# Patient Record
Sex: FEMALE | Race: WHITE | NOT HISPANIC OR LATINO
[De-identification: names, ages, dates, MRNs, and addresses within clinical notes are randomized per-mention and may not be internally consistent; named-entity substitution may affect disease eponyms.]

---

## 2017-09-07 PROBLEM — Z00.00 ENCOUNTER FOR PREVENTIVE HEALTH EXAMINATION: Status: ACTIVE | Noted: 2017-09-07

## 2017-09-14 ENCOUNTER — LABORATORY RESULT (OUTPATIENT)
Age: 82
End: 2017-09-14

## 2017-09-14 ENCOUNTER — APPOINTMENT (OUTPATIENT)
Dept: VASCULAR SURGERY | Facility: CLINIC | Age: 82
End: 2017-09-14
Payer: MEDICARE

## 2017-09-14 VITALS
TEMPERATURE: 97.7 F | WEIGHT: 150 LBS | HEIGHT: 61 IN | HEART RATE: 79 BPM | BODY MASS INDEX: 28.32 KG/M2 | SYSTOLIC BLOOD PRESSURE: 124 MMHG | DIASTOLIC BLOOD PRESSURE: 69 MMHG

## 2017-09-14 VITALS — DIASTOLIC BLOOD PRESSURE: 66 MMHG | HEART RATE: 78 BPM | SYSTOLIC BLOOD PRESSURE: 124 MMHG

## 2017-09-14 PROCEDURE — 99203 OFFICE O/P NEW LOW 30 MIN: CPT | Mod: 25

## 2017-09-14 PROCEDURE — 93880 EXTRACRANIAL BILAT STUDY: CPT

## 2017-09-25 ENCOUNTER — APPOINTMENT (OUTPATIENT)
Dept: VASCULAR SURGERY | Facility: CLINIC | Age: 82
End: 2017-09-25
Payer: MEDICARE

## 2017-09-25 PROCEDURE — 93882 EXTRACRANIAL UNI/LTD STUDY: CPT | Mod: LT

## 2017-10-05 ENCOUNTER — OUTPATIENT (OUTPATIENT)
Dept: OUTPATIENT SERVICES | Facility: HOSPITAL | Age: 82
LOS: 1 days | End: 2017-10-05
Payer: MEDICARE

## 2017-10-05 VITALS
DIASTOLIC BLOOD PRESSURE: 60 MMHG | HEART RATE: 66 BPM | TEMPERATURE: 97 F | WEIGHT: 153 LBS | SYSTOLIC BLOOD PRESSURE: 130 MMHG | RESPIRATION RATE: 16 BRPM | HEIGHT: 60 IN

## 2017-10-05 DIAGNOSIS — Z90.710 ACQUIRED ABSENCE OF BOTH CERVIX AND UTERUS: Chronic | ICD-10-CM

## 2017-10-05 DIAGNOSIS — Z98.62 PERIPHERAL VASCULAR ANGIOPLASTY STATUS: Chronic | ICD-10-CM

## 2017-10-05 DIAGNOSIS — Z90.89 ACQUIRED ABSENCE OF OTHER ORGANS: Chronic | ICD-10-CM

## 2017-10-05 DIAGNOSIS — I65.29 OCCLUSION AND STENOSIS OF UNSPECIFIED CAROTID ARTERY: ICD-10-CM

## 2017-10-05 DIAGNOSIS — E03.9 HYPOTHYROIDISM, UNSPECIFIED: ICD-10-CM

## 2017-10-05 DIAGNOSIS — I10 ESSENTIAL (PRIMARY) HYPERTENSION: ICD-10-CM

## 2017-10-05 DIAGNOSIS — Z95.0 PRESENCE OF CARDIAC PACEMAKER: Chronic | ICD-10-CM

## 2017-10-05 DIAGNOSIS — G47.33 OBSTRUCTIVE SLEEP APNEA (ADULT) (PEDIATRIC): ICD-10-CM

## 2017-10-05 DIAGNOSIS — I73.9 PERIPHERAL VASCULAR DISEASE, UNSPECIFIED: ICD-10-CM

## 2017-10-05 DIAGNOSIS — N18.9 CHRONIC KIDNEY DISEASE, UNSPECIFIED: ICD-10-CM

## 2017-10-05 DIAGNOSIS — I49.9 CARDIAC ARRHYTHMIA, UNSPECIFIED: ICD-10-CM

## 2017-10-05 LAB
BLD GP AB SCN SERPL QL: NEGATIVE — SIGNIFICANT CHANGE UP
BUN SERPL-MCNC: 44 MG/DL — HIGH (ref 7–23)
CALCIUM SERPL-MCNC: 9.4 MG/DL — SIGNIFICANT CHANGE UP (ref 8.4–10.5)
CHLORIDE SERPL-SCNC: 102 MMOL/L — SIGNIFICANT CHANGE UP (ref 98–107)
CO2 SERPL-SCNC: 26 MMOL/L — SIGNIFICANT CHANGE UP (ref 22–31)
CREAT SERPL-MCNC: 1.1 MG/DL — SIGNIFICANT CHANGE UP (ref 0.5–1.3)
GLUCOSE SERPL-MCNC: 101 MG/DL — HIGH (ref 70–99)
HCT VFR BLD CALC: 37.4 % — SIGNIFICANT CHANGE UP (ref 34.5–45)
HGB BLD-MCNC: 12 G/DL — SIGNIFICANT CHANGE UP (ref 11.5–15.5)
MCHC RBC-ENTMCNC: 31.1 PG — SIGNIFICANT CHANGE UP (ref 27–34)
MCHC RBC-ENTMCNC: 32.1 % — SIGNIFICANT CHANGE UP (ref 32–36)
MCV RBC AUTO: 96.9 FL — SIGNIFICANT CHANGE UP (ref 80–100)
NRBC # FLD: 0 — SIGNIFICANT CHANGE UP
PLATELET # BLD AUTO: 221 K/UL — SIGNIFICANT CHANGE UP (ref 150–400)
PMV BLD: 11.2 FL — SIGNIFICANT CHANGE UP (ref 7–13)
POTASSIUM SERPL-MCNC: 4.7 MMOL/L — SIGNIFICANT CHANGE UP (ref 3.5–5.3)
POTASSIUM SERPL-SCNC: 4.7 MMOL/L — SIGNIFICANT CHANGE UP (ref 3.5–5.3)
RBC # BLD: 3.86 M/UL — SIGNIFICANT CHANGE UP (ref 3.8–5.2)
RBC # FLD: 12.5 % — SIGNIFICANT CHANGE UP (ref 10.3–14.5)
RH IG SCN BLD-IMP: POSITIVE — SIGNIFICANT CHANGE UP
SODIUM SERPL-SCNC: 140 MMOL/L — SIGNIFICANT CHANGE UP (ref 135–145)
WBC # BLD: 7.79 K/UL — SIGNIFICANT CHANGE UP (ref 3.8–10.5)
WBC # FLD AUTO: 7.79 K/UL — SIGNIFICANT CHANGE UP (ref 3.8–10.5)

## 2017-10-05 PROCEDURE — 93010 ELECTROCARDIOGRAM REPORT: CPT

## 2017-10-05 NOTE — H&P PST ADULT - PROBLEM SELECTOR PLAN 6
peripheral angiogram with stent in placed from 2012 , pt to maintain on plavix and aspirin until OR as per Dr Bender. Pt understands instruction.

## 2017-10-05 NOTE — H&P PST ADULT - MUSCULOSKELETAL
7 details… detailed exam no calf tenderness/ROM intact/no joint warmth/no joint swelling/normal strength/no joint erythema

## 2017-10-05 NOTE — H&P PST ADULT - NS PRO FEM  PAP SMEARS 3YRS
"Call Type: Triage Call    Presenting Problem: \"I was seen at the [facility] on  5/4/17.  I had an abscess drained, today is the 3rd day and the  swelling has not gone down.  I was told to call back if the swelling  continues past 2 days. I also have a stiff neck and a headache.  The  pain is better.\"  No drainage from the wound.  Per post-op wound  care guideline, patient is advised to see her provider within 72  hours.  Writer paged the on call dental resident, Stepan Forrest via  the answering service at 8:27am to contact the patient directly in  regards to the swelling, stiff neck, and headache.  Triage Note:  Guideline Title: Postoperative Wound Care ; Postoperative Wound Care  Recommended Disposition: See Provider within 72 Hours  Original Inclination: Wanted to speak with a nurse  Override Disposition: Call Dentist Immediately  Intended Action: Call PCP/HCP  Physician Contacted: No  Healing of wound taking longer than surgeon provided expectations ?  YES  Needs suture or staple removal ? NO  New onset OR worsening bleeding from incision requiring pressure to control ? NO  Wound separation AND internal organs protrude through wound or surgical incision  (evisceration) ? NO  New or worsening signs and symptoms that may indicate shock ? NO  Continuous or heavy bleeding from operative site and NOT controlled with 10  minutes of steady pressure ? NO  Separation of wound edges without protrusion of tissue (dehiscence) ? NO  Drain does not drain as expected or comes out ? NO  Medical device (pump, infusion catheter) damaged or not functioning as expected  (leaking, not infusing, swelling at insertion site) ? NO  Noticeable localized swelling appearing as a lump near incision. ? NO  More bleeding from site of instrumentation or procedure OR bleeding lasting longer  than defined in provider specified discharge information ? NO  Wound/incision is becoming progressively more painful ? NO  Evaluated by " provider and has question/concern about their condition, treatment  plan, treatment options, follow-up appointments, or other follow-up care. ? NO  New or increased redness, swelling, pain or purulent drainage (may be foul  smelling) around surgical wound or drain site ? NO  Worsening redness, pain, swelling or purulent drainage at site of recent  intravenous catheter insertion OR at other injection sites ? NO  One or more loose or missing stitches or staples, Steri-Strips or tissue adhesive  comes off in first three days post-surgery ? NO  Physician Instructions:  Care Advice:   yes

## 2017-10-05 NOTE — H&P PST ADULT - HISTORY OF PRESENT ILLNESS
This is an 89 y.o. female This is an 89 y.o. female with history of peripheral vascular disease, s/p angioplasty with stent times 1 -  2012 . Pt had routine evaluation with Dr Bender, had carotid dopplers done . Pt has right carotid stenosis , now for right carotid transcarotid artery revascularization. Pt is maintained on plavix and aspirin ,confirmed with Dr Bender 'office in pst .  pt offers no complaints in pst .

## 2017-10-05 NOTE — H&P PST ADULT - NSANTHOSAYNRD_GEN_A_CORE
No. DIANNE screening performed.  STOP BANG Legend: 0-2 = LOW Risk; 3-4 = INTERMEDIATE Risk; 5-8 = HIGH Risk

## 2017-10-05 NOTE — H&P PST ADULT - PROBLEM SELECTOR PLAN 2
pt instructed to take diovan , amlodipine and hydralazine day of surgery . monitor BP during hospital stay .

## 2017-10-05 NOTE — H&P PST ADULT - PROBLEM SELECTOR PLAN 5
pt has pacemaker placed 2016 , LACW . Pt without information , calls placed , [employer] , Model [id number] in place. OR booking faxed .

## 2017-10-05 NOTE — H&P PST ADULT - VISION (WITH CORRECTIVE LENSES IF THE PATIENT USUALLY WEARS THEM):
macular degeneration (dry)  in right eye/Normal vision: sees adequately in most situations; can see medication labels, newsprint

## 2017-10-05 NOTE — H&P PST ADULT - RS GEN PE MLT RESP DETAILS PC
breath sounds equal/respirations non-labored/no rales/clear to auscultation bilaterally/no wheezes/no rhonchi/good air movement

## 2017-10-05 NOTE — H&P PST ADULT - ABILITY TO HEAR (WITH HEARING AID OR HEARING APPLIANCE IF NORMALLY USED):
uses hearing aids bilaterally but  both need to be repaired/Mildly to Moderately Impaired: difficulty hearing in some environments or speaker may need to increase volume or speak distinctly

## 2017-10-05 NOTE — H&P PST ADULT - PMH
Arrhythmia  pacemaker - LACW -2016  Cataract    CKD (chronic kidney disease)    Gastroesophageal reflux disease without esophagitis    Hypertension    Hypothyroid  last TFT's 10/5/17 with Endocrine  Macular degeneration    Peripheral vascular disease  peripheral stents , on plavix and aspirin and to continue until OR Arrhythmia  pacemaker - LACW -2016  Cataract    CKD (chronic kidney disease)    Gastroesophageal reflux disease without esophagitis    Hypertension    Hypothyroid  last TFT's 10/5/17 with Endocrine  Macular degeneration    Peripheral vascular disease  peripheral stent , on plavix and aspirin and to continue until OR

## 2017-10-05 NOTE — H&P PST ADULT - FAMILY HISTORY
Mother  Still living? No  Family history of early CAD, Age at diagnosis: Age Unknown     Sibling  Still living? No  Family history of early CAD, Age at diagnosis: Age Unknown

## 2017-10-05 NOTE — H&P PST ADULT - PSH
Artificial pacemaker  2016 MARLEN, [facility]  H/O abdominal hysterectomy    H/O angioplasty  PERIPHERAL WITH 1 STENT  S/P tonsillectomy  as child

## 2017-10-16 DIAGNOSIS — I65.29 OCCLUSION AND STENOSIS OF UNSPECIFIED CAROTID ARTERY: ICD-10-CM

## 2017-10-23 NOTE — ASU PATIENT PROFILE, ADULT - VISION (WITH CORRECTIVE LENSES IF THE PATIENT USUALLY WEARS THEM):
Normal vision: sees adequately in most situations; can see medication labels, newsprint/macular degeneration (dry)  in right eye

## 2017-10-23 NOTE — ASU PATIENT PROFILE, ADULT - PMH
Arrhythmia  pacemaker - LACW -2016  Cataract    CKD (chronic kidney disease)    Gastroesophageal reflux disease without esophagitis    Hypertension    Hypothyroid  last TFT's 10/5/17 with Endocrine  Macular degeneration    Peripheral vascular disease  peripheral stent , on plavix and aspirin and to continue until OR

## 2017-10-23 NOTE — ASU PATIENT PROFILE, ADULT - PSH
Artificial pacemaker  2016 MARLEN, [facility]  H/O abdominal hysterectomy    H/O angioplasty  PERIPHERAL WITH 1 STENT  S/P tonsillectomy  as child

## 2017-10-23 NOTE — ASU PATIENT PROFILE, ADULT - NS PREOP UNDERSTANDS INFO
----- Message from Nayan Linares MD sent at 9/6/2017 12:11 PM CDT -----  Submit new record request to [place] for any echocardiogram on file.    yes

## 2017-10-24 ENCOUNTER — INPATIENT (INPATIENT)
Facility: HOSPITAL | Age: 82
LOS: 0 days | Discharge: HOME | End: 2017-10-25
Attending: SURGERY | Admitting: SURGERY
Payer: MEDICARE

## 2017-10-24 ENCOUNTER — APPOINTMENT (OUTPATIENT)
Dept: VASCULAR SURGERY | Facility: HOSPITAL | Age: 82
End: 2017-10-24

## 2017-10-24 VITALS
WEIGHT: 153 LBS | RESPIRATION RATE: 18 BRPM | DIASTOLIC BLOOD PRESSURE: 68 MMHG | HEART RATE: 84 BPM | OXYGEN SATURATION: 100 % | SYSTOLIC BLOOD PRESSURE: 156 MMHG | TEMPERATURE: 97 F | HEIGHT: 60 IN

## 2017-10-24 DIAGNOSIS — I65.29 OCCLUSION AND STENOSIS OF UNSPECIFIED CAROTID ARTERY: ICD-10-CM

## 2017-10-24 DIAGNOSIS — Z98.62 PERIPHERAL VASCULAR ANGIOPLASTY STATUS: Chronic | ICD-10-CM

## 2017-10-24 DIAGNOSIS — Z95.0 PRESENCE OF CARDIAC PACEMAKER: Chronic | ICD-10-CM

## 2017-10-24 DIAGNOSIS — Z90.710 ACQUIRED ABSENCE OF BOTH CERVIX AND UTERUS: Chronic | ICD-10-CM

## 2017-10-24 DIAGNOSIS — Z90.89 ACQUIRED ABSENCE OF OTHER ORGANS: Chronic | ICD-10-CM

## 2017-10-24 LAB
BASE EXCESS BLDA CALC-SCNC: -0.9 MMOL/L — SIGNIFICANT CHANGE UP
CA-I BLDA-SCNC: 1.26 MMOL/L — SIGNIFICANT CHANGE UP (ref 1.15–1.29)
GLUCOSE BLDA-MCNC: 99 MG/DL — SIGNIFICANT CHANGE UP (ref 70–99)
HCO3 BLDA-SCNC: 24 MMOL/L — SIGNIFICANT CHANGE UP (ref 22–26)
HCT VFR BLDA CALC: 36.1 % — SIGNIFICANT CHANGE UP (ref 34.5–46.5)
HGB BLDA-MCNC: 11.7 G/DL — SIGNIFICANT CHANGE UP (ref 11.5–15.5)
PCO2 BLDA: 43 MMHG — SIGNIFICANT CHANGE UP (ref 32–48)
PH BLDA: 7.36 PH — SIGNIFICANT CHANGE UP (ref 7.35–7.45)
PO2 BLDA: 419 MMHG — HIGH (ref 83–108)
POTASSIUM BLDA-SCNC: 3.8 MMOL/L — SIGNIFICANT CHANGE UP (ref 3.4–4.5)
SAO2 % BLDA: 99.4 % — HIGH (ref 95–99)
SODIUM BLDA-SCNC: 135 MMOL/L — LOW (ref 136–146)

## 2017-10-24 PROCEDURE — 37215 TRANSCATH STENT CCA W/EPS: CPT

## 2017-10-24 RX ORDER — OXYCODONE AND ACETAMINOPHEN 5; 325 MG/1; MG/1
1 TABLET ORAL EVERY 4 HOURS
Qty: 0 | Refills: 0 | Status: DISCONTINUED | OUTPATIENT
Start: 2017-10-24 | End: 2017-10-25

## 2017-10-24 RX ORDER — SIMVASTATIN 20 MG/1
40 TABLET, FILM COATED ORAL AT BEDTIME
Qty: 0 | Refills: 0 | Status: DISCONTINUED | OUTPATIENT
Start: 2017-10-24 | End: 2017-10-25

## 2017-10-24 RX ORDER — LEVOTHYROXINE SODIUM 125 MCG
88 TABLET ORAL DAILY
Qty: 0 | Refills: 0 | Status: DISCONTINUED | OUTPATIENT
Start: 2017-10-25 | End: 2017-10-25

## 2017-10-24 RX ORDER — PANTOPRAZOLE SODIUM 20 MG/1
40 TABLET, DELAYED RELEASE ORAL
Qty: 0 | Refills: 0 | Status: DISCONTINUED | OUTPATIENT
Start: 2017-10-24 | End: 2017-10-25

## 2017-10-24 RX ORDER — HYDRALAZINE HCL 50 MG
10 TABLET ORAL EVERY 6 HOURS
Qty: 0 | Refills: 0 | Status: DISCONTINUED | OUTPATIENT
Start: 2017-10-24 | End: 2017-10-25

## 2017-10-24 RX ORDER — ONDANSETRON 8 MG/1
4 TABLET, FILM COATED ORAL ONCE
Qty: 0 | Refills: 0 | Status: DISCONTINUED | OUTPATIENT
Start: 2017-10-24 | End: 2017-10-24

## 2017-10-24 RX ORDER — CLOPIDOGREL BISULFATE 75 MG/1
75 TABLET, FILM COATED ORAL DAILY
Qty: 0 | Refills: 0 | Status: DISCONTINUED | OUTPATIENT
Start: 2017-10-24 | End: 2017-10-25

## 2017-10-24 RX ORDER — FAMOTIDINE 10 MG/ML
20 INJECTION INTRAVENOUS AT BEDTIME
Qty: 0 | Refills: 0 | Status: DISCONTINUED | OUTPATIENT
Start: 2017-10-24 | End: 2017-10-25

## 2017-10-24 RX ORDER — SODIUM CHLORIDE 9 MG/ML
1000 INJECTION INTRAMUSCULAR; INTRAVENOUS; SUBCUTANEOUS
Qty: 0 | Refills: 0 | Status: DISCONTINUED | OUTPATIENT
Start: 2017-10-24 | End: 2017-10-24

## 2017-10-24 RX ORDER — HEPARIN SODIUM 5000 [USP'U]/ML
5000 INJECTION INTRAVENOUS; SUBCUTANEOUS EVERY 8 HOURS
Qty: 0 | Refills: 0 | Status: DISCONTINUED | OUTPATIENT
Start: 2017-10-24 | End: 2017-10-25

## 2017-10-24 RX ORDER — DEXTROSE MONOHYDRATE, SODIUM CHLORIDE, AND POTASSIUM CHLORIDE 50; .745; 4.5 G/1000ML; G/1000ML; G/1000ML
1000 INJECTION, SOLUTION INTRAVENOUS
Qty: 0 | Refills: 0 | Status: DISCONTINUED | OUTPATIENT
Start: 2017-10-24 | End: 2017-10-24

## 2017-10-24 RX ORDER — AMLODIPINE BESYLATE 2.5 MG/1
10 TABLET ORAL DAILY
Qty: 0 | Refills: 0 | Status: DISCONTINUED | OUTPATIENT
Start: 2017-10-25 | End: 2017-10-25

## 2017-10-24 RX ORDER — HYDROMORPHONE HYDROCHLORIDE 2 MG/ML
0.5 INJECTION INTRAMUSCULAR; INTRAVENOUS; SUBCUTANEOUS
Qty: 0 | Refills: 0 | Status: DISCONTINUED | OUTPATIENT
Start: 2017-10-24 | End: 2017-10-24

## 2017-10-24 RX ORDER — HYDROMORPHONE HYDROCHLORIDE 2 MG/ML
0.25 INJECTION INTRAMUSCULAR; INTRAVENOUS; SUBCUTANEOUS
Qty: 0 | Refills: 0 | Status: DISCONTINUED | OUTPATIENT
Start: 2017-10-24 | End: 2017-10-24

## 2017-10-24 RX ORDER — ESCITALOPRAM OXALATE 10 MG/1
10 TABLET, FILM COATED ORAL DAILY
Qty: 0 | Refills: 0 | Status: DISCONTINUED | OUTPATIENT
Start: 2017-10-25 | End: 2017-10-25

## 2017-10-24 RX ORDER — HYDRALAZINE HCL 50 MG
50 TABLET ORAL DAILY
Qty: 0 | Refills: 0 | Status: DISCONTINUED | OUTPATIENT
Start: 2017-10-25 | End: 2017-10-25

## 2017-10-24 RX ORDER — VALSARTAN 80 MG/1
320 TABLET ORAL DAILY
Qty: 0 | Refills: 0 | Status: DISCONTINUED | OUTPATIENT
Start: 2017-10-25 | End: 2017-10-25

## 2017-10-24 RX ORDER — ASPIRIN/CALCIUM CARB/MAGNESIUM 324 MG
81 TABLET ORAL DAILY
Qty: 0 | Refills: 0 | Status: DISCONTINUED | OUTPATIENT
Start: 2017-10-24 | End: 2017-10-25

## 2017-10-24 RX ADMIN — HEPARIN SODIUM 5000 UNIT(S): 5000 INJECTION INTRAVENOUS; SUBCUTANEOUS at 17:25

## 2017-10-24 RX ADMIN — FAMOTIDINE 20 MILLIGRAM(S): 10 INJECTION INTRAVENOUS at 21:12

## 2017-10-24 RX ADMIN — Medication 81 MILLIGRAM(S): at 21:12

## 2017-10-24 RX ADMIN — SIMVASTATIN 40 MILLIGRAM(S): 20 TABLET, FILM COATED ORAL at 21:12

## 2017-10-24 RX ADMIN — CLOPIDOGREL BISULFATE 75 MILLIGRAM(S): 75 TABLET, FILM COATED ORAL at 17:25

## 2017-10-24 NOTE — PATIENT PROFILE ADULT. - PSH
Artificial pacemaker  2016 MARLEN, [facility]  H/O abdominal hysterectomy    H/O angioplasty  PERIPHERAL WITH 1 STENT  S/P tonsillectomy  as child

## 2017-10-24 NOTE — ASU PREOP CHECKLIST - COMMENTS
Pt took diovan, levothyroxine, omeprazole, amlodipine, hydralazine, and famotidine today at 0300 with sip water

## 2017-10-24 NOTE — PROGRESS NOTE ADULT - SUBJECTIVE AND OBJECTIVE BOX
STATUS POST:  Right Transcarotid Artery Revascularization (TCAR)     POD#: 0    INTERVAL EVENTS: no events, hemodynamically stable in PACU    SUBJECTIVE: Pt seen + examined  SOB:  [] YES [X] NO  Dyspnea: []YES [X]NO  Chest Discomfort: [] YES [X] NO    Nausea: [] YES [X] NO           Vomiting: [] YES [X] NO  Pain Control Adequate: [X] YES [] NO      VITALS  T(C): 37 (10-24-17 @ 15:00), Max: 37 (10-24-17 @ 15:00)  HR: 62 (10-24-17 @ 16:00) (60 - 84)  BP: 127/52 (10-24-17 @ 16:00) (113/36 - 156/68)  BP(mean): --  RR: 17 (10-24-17 @ 16:00) (12 - 18)  SpO2: 100% (10-24-17 @ 16:00) (95% - 100%)  Wt(kg): --  CAPILLARY BLOOD GLUCOSE          Is/Os      PHYSICAL EXAM:  General: NAD, Lying in bed comfortably  Neuro: alert, oriented x3. Minimal right-sided facial droop. Otherwise CN II-XII grossly intact. Strength 5/5 in BL upper and lower.   HEENT: NC/AT, EOMI  Neck: Soft, supple. Right neck dressing, with mild serosanguinous drainage. Minimal edema. No surrounding erythema, warmth.   Cardio: RRR, nml S1/S2  Resp: Good effort, CTA b/l  GI/Abd: Left groin dressing, minimal serosanguinous drainage. No edema, erythema. Nontender, no hematoma.   Vascular: All 4 extremities warm. No edema.   Skin: Intact, no breakdown  Lymphatic/Nodes: No palpable lymphadenopathy  Musculoskeletal: All 4 extremities moving spontaneously, no limitations, no LE edema, no calf tenderness.    MEDICATIONS (STANDING): aspirin  chewable 81 milliGRAM(s) Oral daily  clopidogrel Tablet 75 milliGRAM(s) Oral daily  dextrose 5% + sodium chloride 0.45% with potassium chloride 20 mEq/L 1000 milliLiter(s) IV Continuous <Continuous>  famotidine    Tablet 20 milliGRAM(s) Oral at bedtime  heparin  Injectable 5000 Unit(s) SubCutaneous every 8 hours  pantoprazole    Tablet 40 milliGRAM(s) Oral before breakfast  simvastatin 40 milliGRAM(s) Oral at bedtime  sodium chloride 0.9%. 1000 milliLiter(s) IV Continuous <Continuous>    MEDICATIONS (PRN):hydrALAZINE Injectable 10 milliGRAM(s) IV Push every 6 hours PRN SBP>140  HYDROmorphone  Injectable 0.25 milliGRAM(s) IV Push every 10 minutes PRN Moderate Pain  HYDROmorphone  Injectable 0.5 milliGRAM(s) IV Push every 10 minutes PRN Severe Pain (7 - 10)  ondansetron Injectable 4 milliGRAM(s) IV Push once PRN Nausea and/or Vomiting  oxyCODONE    5 mG/acetaminophen 325 mG 1 Tablet(s) Oral every 4 hours PRN Moderate Pain      LABS            ABG (10-24 @ 07:45)     7.36 / 43 / 419<H> / 24 / -0.9 / 99.4<H>%     Lactate:          IMAGING STUDIES    ASSESSMENT  89y female s/p Right Transcarotid Artery Revascularization (TCAR)     PLAN  - Diet: regular  - Pain control with PO/IV medications.    - Continue home medications  - OOB, ambulate as tolerated  - continue chemical VTE ppx  - Will discuss with attending.

## 2017-10-24 NOTE — BRIEF OPERATIVE NOTE - POST-OP DX
Carotid stenosis, right  10/24/2017    Active  Lowell Noriega  CKD (chronic kidney disease)  10/05/2017    Active  Annie Bernard  Hypertension  10/05/2017    Active  Annie Bernard  Hypothyroidism  10/05/2017    Annie Mercado  Pacemaker  10/05/2017    Annie Mercado  PVD (peripheral vascular disease)  10/05/2017    Annie Mercado  S/P peripheral artery angioplasty with stent placement  10/05/2017    Annie Mercado

## 2017-10-25 ENCOUNTER — TRANSCRIPTION ENCOUNTER (OUTPATIENT)
Age: 82
End: 2017-10-25

## 2017-10-25 VITALS
HEART RATE: 66 BPM | TEMPERATURE: 97 F | OXYGEN SATURATION: 98 % | DIASTOLIC BLOOD PRESSURE: 56 MMHG | RESPIRATION RATE: 17 BRPM | SYSTOLIC BLOOD PRESSURE: 134 MMHG

## 2017-10-25 LAB
BUN SERPL-MCNC: 31 MG/DL — HIGH (ref 7–23)
CALCIUM SERPL-MCNC: 9 MG/DL — SIGNIFICANT CHANGE UP (ref 8.4–10.5)
CHLORIDE SERPL-SCNC: 109 MMOL/L — HIGH (ref 98–107)
CO2 SERPL-SCNC: 16 MMOL/L — LOW (ref 22–31)
CREAT SERPL-MCNC: 0.97 MG/DL — SIGNIFICANT CHANGE UP (ref 0.5–1.3)
GLUCOSE SERPL-MCNC: 115 MG/DL — HIGH (ref 70–99)
HCT VFR BLD CALC: 32 % — LOW (ref 34.5–45)
HGB BLD-MCNC: 10.2 G/DL — LOW (ref 11.5–15.5)
MCHC RBC-ENTMCNC: 30.9 PG — SIGNIFICANT CHANGE UP (ref 27–34)
MCHC RBC-ENTMCNC: 31.9 % — LOW (ref 32–36)
MCV RBC AUTO: 97 FL — SIGNIFICANT CHANGE UP (ref 80–100)
NRBC # FLD: 0 — SIGNIFICANT CHANGE UP
PLATELET # BLD AUTO: 211 K/UL — SIGNIFICANT CHANGE UP (ref 150–400)
PMV BLD: 11.1 FL — SIGNIFICANT CHANGE UP (ref 7–13)
POTASSIUM SERPL-MCNC: 5.3 MMOL/L — SIGNIFICANT CHANGE UP (ref 3.5–5.3)
POTASSIUM SERPL-SCNC: 5.3 MMOL/L — SIGNIFICANT CHANGE UP (ref 3.5–5.3)
RBC # BLD: 3.3 M/UL — LOW (ref 3.8–5.2)
RBC # FLD: 12.7 % — SIGNIFICANT CHANGE UP (ref 10.3–14.5)
SODIUM SERPL-SCNC: 138 MMOL/L — SIGNIFICANT CHANGE UP (ref 135–145)
WBC # BLD: 12.64 K/UL — HIGH (ref 3.8–10.5)
WBC # FLD AUTO: 12.64 K/UL — HIGH (ref 3.8–10.5)

## 2017-10-25 RX ORDER — HYDRALAZINE HCL 50 MG
10 TABLET ORAL EVERY 6 HOURS
Qty: 0 | Refills: 0 | Status: DISCONTINUED | OUTPATIENT
Start: 2017-10-25 | End: 2017-10-25

## 2017-10-25 RX ADMIN — VALSARTAN 320 MILLIGRAM(S): 80 TABLET ORAL at 05:16

## 2017-10-25 RX ADMIN — AMLODIPINE BESYLATE 10 MILLIGRAM(S): 2.5 TABLET ORAL at 05:16

## 2017-10-25 RX ADMIN — PANTOPRAZOLE SODIUM 40 MILLIGRAM(S): 20 TABLET, DELAYED RELEASE ORAL at 05:16

## 2017-10-25 RX ADMIN — HEPARIN SODIUM 5000 UNIT(S): 5000 INJECTION INTRAVENOUS; SUBCUTANEOUS at 05:16

## 2017-10-25 RX ADMIN — Medication 50 MILLIGRAM(S): at 05:16

## 2017-10-25 RX ADMIN — ESCITALOPRAM OXALATE 10 MILLIGRAM(S): 10 TABLET, FILM COATED ORAL at 11:18

## 2017-10-25 RX ADMIN — CLOPIDOGREL BISULFATE 75 MILLIGRAM(S): 75 TABLET, FILM COATED ORAL at 11:18

## 2017-10-25 RX ADMIN — Medication 88 MICROGRAM(S): at 05:16

## 2017-10-25 RX ADMIN — Medication 81 MILLIGRAM(S): at 11:18

## 2017-10-25 NOTE — DISCHARGE NOTE ADULT - CARE PLAN
Principal Discharge DX:	Occlusion and stenosis of unspecified carotid artery  Goal:	wound healing  Instructions for follow-up, activity and diet:	WOUND CARE: Apply clean gauze and paper tape over drain site once a day as needed.  Allow steristrips to fall off on their own.  BATHING: Please do not submerge wound underwater. You may shower and/or sponge bathe. It is OK to wash drain wound site.  ACTIVITY: No heavy lifting or straining. Otherwise, you may return to your usual level of physical activity. If you are taking narcotic pain medication (such as Percocet) DO NOT drive a car, operate machinery or make important decisions.  DIET: Return to your usual diet.  NOTIFY YOUR SURGEON IF: You have any bleeding that does not stop, any pus draining from your wound(s), any fever (over 100.4 F) or chills, persistent nausea/vomiting, persistent diarrhea, or if your pain is not controlled on your discharge pain medications.  FOLLOW-UP: Please follow up with your primary care physician in one week regarding your hospitalization. Please follow up with Dr. Bender in 1-2 weeks.  Call to make an appointment [phone].  Secondary Diagnosis:	CKD (chronic kidney disease)  Instructions for follow-up, activity and diet:	Please follow up with your primary care physician regarding your hospitalization  Secondary Diagnosis:	Hypertension  Instructions for follow-up, activity and diet:	Please follow up with your primary care physician regarding your hospitalization  Secondary Diagnosis:	Hypothyroid  Instructions for follow-up, activity and diet:	Please follow up with your primary care physician regarding your hospitalization  Secondary Diagnosis:	Peripheral vascular disease  Instructions for follow-up, activity and diet:	Please follow up with your primary care physician regarding your hospitalization Principal Discharge DX:	Occlusion and stenosis of unspecified carotid artery  Goal:	wound healing  Instructions for follow-up, activity and diet:	WOUND CARE: Apply clean gauze and paper tape over drain site once a day as needed.  Allow steristrips to fall off on their own.  BATHING: Please do not submerge wound underwater. You may shower and/or sponge bathe. It is OK to wash drain wound site.  ACTIVITY: No heavy lifting or straining. Otherwise, you may return to your usual level of physical activity. If you are taking narcotic pain medication (such as Percocet) DO NOT drive a car, operate machinery or make important decisions.  DIET: Return to your usual diet.  NOTIFY YOUR SURGEON IF: You have any bleeding that does not stop, any pus draining from your wound(s), any fever (over 100.4 F) or chills, persistent nausea/vomiting, persistent diarrhea, or if your pain is not controlled on your discharge pain medications.  FOLLOW-UP: Please follow up with your primary care physician in one week regarding your hospitalization. Please follow up with Dr. Bender in 1-2 weeks.  Call to make an appointment [phone].  Secondary Diagnosis:	CKD (chronic kidney disease)  Instructions for follow-up, activity and diet:	Please follow up with your primary care physician regarding your hospitalization  Secondary Diagnosis:	Hypertension  Instructions for follow-up, activity and diet:	Please follow up with your primary care physician regarding your hospitalization  Secondary Diagnosis:	Hypothyroid  Instructions for follow-up, activity and diet:	Please follow up with your primary care physician regarding your hospitalization  Secondary Diagnosis:	Peripheral vascular disease  Instructions for follow-up, activity and diet:	Please follow up with your primary care physician regarding your hospitalization

## 2017-10-25 NOTE — DISCHARGE NOTE ADULT - PLAN OF CARE
wound healing WOUND CARE: Apply clean gauze and paper tape over drain site once a day as needed.  Allow steristrips to fall off on their own.  BATHING: Please do not submerge wound underwater. You may shower and/or sponge bathe. It is OK to wash drain wound site.  ACTIVITY: No heavy lifting or straining. Otherwise, you may return to your usual level of physical activity. If you are taking narcotic pain medication (such as Percocet) DO NOT drive a car, operate machinery or make important decisions.  DIET: Return to your usual diet.  NOTIFY YOUR SURGEON IF: You have any bleeding that does not stop, any pus draining from your wound(s), any fever (over 100.4 F) or chills, persistent nausea/vomiting, persistent diarrhea, or if your pain is not controlled on your discharge pain medications.  FOLLOW-UP: Please follow up with your primary care physician in one week regarding your hospitalization. Please follow up with Dr. Bender in 1-2 weeks.  Call to make an appointment [phone]. Please follow up with your primary care physician regarding your hospitalization WOUND CARE: Apply clean gauze and paper tape over drain site once a day as needed.  Allow steristrips to fall off on their own.  BATHING: Please do not submerge wound underwater. You may shower and/or sponge bathe. It is OK to wash drain wound site.  ACTIVITY: No heavy lifting or straining. Otherwise, you may return to your usual level of physical activity. If you are taking narcotic pain medication (such as Percocet) DO NOT drive a car, operate machinery or make important decisions.  DIET: Return to your usual diet.  NOTIFY YOUR SURGEON IF: You have any bleeding that does not stop, any pus draining from your wound(s), any fever (over 100.4 F) or chills, persistent nausea/vomiting, persistent diarrhea, or if your pain is not controlled on your discharge pain medications.  FOLLOW-UP: Please follow up with your primary care physician in one week regarding your hospitalization. Please follow up with Dr. Bender in 1-2 weeks.  Call to make an appointment [phone].

## 2017-10-25 NOTE — PROGRESS NOTE ADULT - SUBJECTIVE AND OBJECTIVE BOX
VASCULAR SURGERY PROGRESS NOTE    SUBJECTIVE: Pt seen at bedside. Denies pain, nausea, vomiting. No acute events o/n.     Vital Signs Last 24 Hrs  T(C): 36.2 (25 Oct 2017 10:55), Max: 37 (24 Oct 2017 15:00)  T(F): 97.2 (25 Oct 2017 10:55), Max: 98.6 (24 Oct 2017 15:00)  HR: 66 (25 Oct 2017 10:55) (59 - 76)  BP: 134/56 (25 Oct 2017 10:55) (119/64 - 154/55)  BP(mean): --  RR: 17 (25 Oct 2017 10:55) (12 - 18)  SpO2: 98% (25 Oct 2017 10:55) (95% - 100%)        PHYSICAL EXAM:  General: NAD  Neuro: alert, oriented x3. Minimal right-sided facial droop. Otherwise CN II-XII grossly intact. Strength 5/5 in BL upper and lower.   HEENT: NC/AT, EOMI  Neck: Soft, supple. Right neck dressing, with mild serosanguinous drainage. Minimal edema. No surrounding erythema, warmth.   Cardio: RRR, nml S1/S2  Resp: Good effort, CTA b/l  GI/Abd: Left groin dressing, minimal serosanguinous drainage. No edema, erythema. Nontender, no hematoma.   Vascular: All 4 extremities warm. No edema.   Skin: Intact, no breakdown, Groin benign, no hematoma  Lymphatic/Nodes: No palpable lymphadenopathy  Musculoskeletal: All 4 extremities moving spontaneously, no limitations, no LE edema, no calf tenderness.    I&O's Summary    24 Oct 2017 07:01  -  25 Oct 2017 07:00  --------------------------------------------------------  IN: 750 mL / OUT: 700 mL / NET: 50 mL      I&O's Detail    24 Oct 2017 07:01  -  25 Oct 2017 07:00  --------------------------------------------------------  IN:    dextrose 5% + sodium chloride 0.45% with potassium chloride 20 mEq/L: 450 mL    Oral Fluid: 300 mL  Total IN: 750 mL    OUT:    Voided: 700 mL  Total OUT: 700 mL    Total NET: 50 mL          MEDICATIONS  (STANDING):  amLODIPine   Tablet 10 milliGRAM(s) Oral daily  aspirin  chewable 81 milliGRAM(s) Oral daily  clopidogrel Tablet 75 milliGRAM(s) Oral daily  escitalopram 10 milliGRAM(s) Oral daily  famotidine    Tablet 20 milliGRAM(s) Oral at bedtime  heparin  Injectable 5000 Unit(s) SubCutaneous every 8 hours  hydrALAZINE 50 milliGRAM(s) Oral daily  levothyroxine 88 MICROGram(s) Oral daily  pantoprazole    Tablet 40 milliGRAM(s) Oral before breakfast  simvastatin 40 milliGRAM(s) Oral at bedtime  valsartan 320 milliGRAM(s) Oral daily    MEDICATIONS  (PRN):  hydrALAZINE Injectable 10 milliGRAM(s) IV Push every 6 hours PRN SBP>160  oxyCODONE    5 mG/acetaminophen 325 mG 1 Tablet(s) Oral every 4 hours PRN Moderate Pain      LABS:                        10.2   12.64 )-----------( 211      ( 25 Oct 2017 07:19 )             32.0     10-25    138  |  109<H>  |  31<H>  ----------------------------<  115<H>  5.3   |  16<L>  |  0.97    Ca    9.0      25 Oct 2017 05:39                RADIOLOGY & ADDITIONAL STUDIES:

## 2017-10-25 NOTE — DISCHARGE NOTE ADULT - HOSPITAL COURSE
This is an 89 y.o. female with history of peripheral vascular disease, s/p angioplasty with stent times 1 -  2012 . Pt had routine evaluation with Dr Bender, had carotid dopplers done . Pt has right carotid stenosis , now for right carotid transcarotid artery revascularization. Pt is maintained on plavix and aspirin ,confirmed with Dr Bender 'office in pst .  pt offers no complaints in pst .     Pt admitted to vascular surgery service and went to the OR with Dr. Bender on 10/24/17 for a R TCAR.  Pt tolerated procedure well, without complication.  Post op pt was transferred from PACU to surgical floor.  Diet was restarted and advanced as tolerated.  Pain control was transitioned from IV to PO pain meds.  Pt currently ambulating, voiding, tolerating a regular diet, with pain well controlled on PO pain meds.  Per team and attending, pt is hemodynamically stable for discharge home to follow up as an outpatient.

## 2017-10-25 NOTE — DISCHARGE NOTE ADULT - CARE PROVIDER_API CALL
Tra Bender), Surgery; Vascular Surgery  [id number] [place] [address]  Phone: [phone]  Fax: [phone]

## 2017-10-25 NOTE — DISCHARGE NOTE ADULT - MEDICATION SUMMARY - MEDICATIONS TO TAKE
I will START or STAY ON the medications listed below when I get home from the hospital:    Caltrate 600 + D3 800 UNITS  -- orally once a day  -- Indication: For Vitamin    Omega 3 fatty acids  -- 1000 milligram(s) by mouth once a day  -- Indication: For Vitamin    oxyCODONE-acetaminophen 5 mg-325 mg oral tablet  -- 1 tab(s) by mouth every 6 hours, As Needed -Moderate Pain MDD:4 tabs  -- Indication: For Severe Pain    acetaminophen 650 mg oral tablet  -- 1 tab(s) by mouth every 6 hours, As Needed for mild to moderate pain  -- Indication: For Mild to moderate pain    Aspir 81 oral delayed release tablet  -- 1 tab(s) by mouth once a day AM  -- Indication: For Occlusion and stenosis of carotid artery    Diovan 320 mg oral tablet  -- 1 tab(s) by mouth once a day AM  -- Indication: For HTN    Lexapro 10 mg oral tablet  -- 1 tab(s) by mouth once a day AM  -- Indication: For Depression    simvastatin 20 mg oral tablet  -- 1 tab(s) by mouth once a day (at bedtime)  -- Indication: For Cholesterol    spironolactone-hydrochlorothiazide 25 mg-25 mg oral tablet  -- 1 tab(s) by mouth once a day am  -- Indication: For HTN    clopidogrel 75 mg oral tablet  -- 1 tab(s) by mouth once a day at 6pm  -- Indication: For PVD    amLODIPine 10 mg oral tablet  -- 1 tab(s) by mouth once a day am  -- Indication: For HTN    Zantac 150 oral tablet  -- orally once a day (at bedtime)  -- Indication: For GERD    CoQ10  -- 100 milligram(s) by mouth once a day PM  -- Indication: For Vitamin    omeprazole 40 mg oral delayed release capsule  -- 1 cap(s) by mouth once a day am  -- Indication: For GERD    levothyroxine 88 mcg (0.088 mg) oral tablet  -- 1 tab(s) by mouth once a day AM  -- Indication: For Hypothyroid    hydrALAZINE 50 mg oral tablet  -- orally once a day AM  -- Indication: For HTN    Centrum oral tablet  -- 1 tab(s) by mouth once a day  -- Indication: For Vitamin    Vitamin D3 1000 intl units oral capsule  -- 1 cap(s) by mouth once a day  -- Indication: For Vitamin

## 2017-10-25 NOTE — PROGRESS NOTE ADULT - ASSESSMENT
89y female s/p Right Transcarotid Artery Revascularization (TCAR)     PLAN  - Diet: regular  - Pain control with PO/IV medications.    - Continue home medications  - OOB, ambulate as tolerated  - continue chemical VTE ppx  - dc planning

## 2017-10-27 ENCOUNTER — TRANSCRIPTION ENCOUNTER (OUTPATIENT)
Age: 82
End: 2017-10-27

## 2017-11-09 ENCOUNTER — APPOINTMENT (OUTPATIENT)
Dept: VASCULAR SURGERY | Facility: CLINIC | Age: 82
End: 2017-11-09
Payer: MEDICARE

## 2017-11-09 VITALS — DIASTOLIC BLOOD PRESSURE: 70 MMHG | SYSTOLIC BLOOD PRESSURE: 144 MMHG | HEART RATE: 67 BPM

## 2017-11-09 VITALS
HEART RATE: 68 BPM | DIASTOLIC BLOOD PRESSURE: 74 MMHG | BODY MASS INDEX: 29.45 KG/M2 | HEIGHT: 60 IN | TEMPERATURE: 97.7 F | WEIGHT: 150 LBS | SYSTOLIC BLOOD PRESSURE: 137 MMHG

## 2017-11-09 PROCEDURE — 93882 EXTRACRANIAL UNI/LTD STUDY: CPT

## 2017-11-09 PROCEDURE — 99024 POSTOP FOLLOW-UP VISIT: CPT

## 2017-11-09 RX ORDER — MULTIVIT-MIN/FA/LYCOPEN/LUTEIN .4-300-25
TABLET ORAL
Refills: 0 | Status: ACTIVE | COMMUNITY

## 2017-11-09 RX ORDER — UBIDECARENONE 100 MG
100 CAPSULE ORAL
Refills: 0 | Status: ACTIVE | COMMUNITY

## 2017-11-09 RX ORDER — ESCITALOPRAM OXALATE 10 MG/1
10 TABLET, FILM COATED ORAL
Refills: 0 | Status: ACTIVE | COMMUNITY

## 2017-11-09 RX ORDER — RANITIDINE HCL 150 MG
150 CAPSULE ORAL
Refills: 0 | Status: ACTIVE | COMMUNITY

## 2017-11-09 RX ORDER — HYDRALAZINE HYDROCHLORIDE 50 MG/1
50 TABLET ORAL
Refills: 0 | Status: ACTIVE | COMMUNITY

## 2017-11-09 RX ORDER — OMEPRAZOLE 40 MG/1
40 CAPSULE, DELAYED RELEASE ORAL
Refills: 0 | Status: ACTIVE | COMMUNITY

## 2017-11-09 RX ORDER — SIMVASTATIN 20 MG/1
20 TABLET, FILM COATED ORAL
Refills: 0 | Status: ACTIVE | COMMUNITY

## 2017-11-09 RX ORDER — AMLODIPINE BESYLATE 10 MG/1
10 TABLET ORAL
Qty: 30 | Refills: 0 | Status: ACTIVE | COMMUNITY
Start: 2017-04-07

## 2017-11-09 RX ORDER — CALCIUM CARBONATE/VITAMIN D3 600 MG-20
TABLET ORAL
Refills: 0 | Status: ACTIVE | COMMUNITY

## 2017-11-09 RX ORDER — SPIRONOLACTONE AND HYDROCHLOROTHIAZIDE 25; 25 MG/1; MG/1
25-25 TABLET, FILM COATED ORAL
Refills: 0 | Status: ACTIVE | COMMUNITY

## 2017-11-09 RX ORDER — OMEGA-3/DHA/EPA/FISH OIL 300-1000MG
1000 CAPSULE,DELAYED RELEASE (ENTERIC COATED) ORAL
Refills: 0 | Status: ACTIVE | COMMUNITY

## 2017-11-13 RX ORDER — CLOPIDOGREL BISULFATE 75 MG/1
75 TABLET, FILM COATED ORAL DAILY
Qty: 90 | Refills: 3 | Status: ACTIVE | COMMUNITY
Start: 1900-01-01 | End: 1900-01-01

## 2017-12-13 RX ORDER — OXYCODONE AND ACETAMINOPHEN 5; 325 MG/1; MG/1
5-325 TABLET ORAL
Qty: 6 | Refills: 0 | Status: ACTIVE | COMMUNITY
Start: 2017-10-25

## 2017-12-14 ENCOUNTER — APPOINTMENT (OUTPATIENT)
Dept: VASCULAR SURGERY | Facility: CLINIC | Age: 82
End: 2017-12-14
Payer: MEDICARE

## 2017-12-14 VITALS
HEIGHT: 60 IN | SYSTOLIC BLOOD PRESSURE: 127 MMHG | TEMPERATURE: 97.8 F | DIASTOLIC BLOOD PRESSURE: 73 MMHG | HEART RATE: 69 BPM | WEIGHT: 150 LBS | BODY MASS INDEX: 29.45 KG/M2

## 2017-12-14 VITALS — SYSTOLIC BLOOD PRESSURE: 137 MMHG | HEART RATE: 74 BPM | DIASTOLIC BLOOD PRESSURE: 72 MMHG

## 2017-12-14 DIAGNOSIS — Z98.890 OTHER SPECIFIED POSTPROCEDURAL STATES: ICD-10-CM

## 2017-12-14 DIAGNOSIS — I65.21 OCCLUSION AND STENOSIS OF RIGHT CAROTID ARTERY: ICD-10-CM

## 2017-12-14 PROCEDURE — 93882 EXTRACRANIAL UNI/LTD STUDY: CPT

## 2017-12-14 PROCEDURE — 99024 POSTOP FOLLOW-UP VISIT: CPT

## 2017-12-15 PROBLEM — I65.21 ARTERIOSCLEROSIS OF CAROTID ARTERY, RIGHT: Status: ACTIVE | Noted: 2017-09-14

## 2017-12-15 PROBLEM — Z98.890 HISTORY OF CAROTID ENDARTERECTOMY: Status: ACTIVE | Noted: 2017-11-15

## 2018-02-14 ENCOUNTER — EMERGENCY (EMERGENCY)
Facility: HOSPITAL | Age: 83
LOS: 1 days | Discharge: HOME | End: 2018-02-14
Attending: EMERGENCY MEDICINE | Admitting: EMERGENCY MEDICINE
Payer: MEDICARE

## 2018-02-14 VITALS
DIASTOLIC BLOOD PRESSURE: 50 MMHG | OXYGEN SATURATION: 98 % | HEART RATE: 62 BPM | RESPIRATION RATE: 16 BRPM | SYSTOLIC BLOOD PRESSURE: 168 MMHG

## 2018-02-14 VITALS
HEART RATE: 96 BPM | SYSTOLIC BLOOD PRESSURE: 123 MMHG | OXYGEN SATURATION: 99 % | DIASTOLIC BLOOD PRESSURE: 78 MMHG | TEMPERATURE: 99 F | RESPIRATION RATE: 18 BRPM

## 2018-02-14 DIAGNOSIS — Z98.62 PERIPHERAL VASCULAR ANGIOPLASTY STATUS: Chronic | ICD-10-CM

## 2018-02-14 DIAGNOSIS — Z95.0 PRESENCE OF CARDIAC PACEMAKER: Chronic | ICD-10-CM

## 2018-02-14 DIAGNOSIS — Z90.89 ACQUIRED ABSENCE OF OTHER ORGANS: Chronic | ICD-10-CM

## 2018-02-14 DIAGNOSIS — Z90.710 ACQUIRED ABSENCE OF BOTH CERVIX AND UTERUS: Chronic | ICD-10-CM

## 2018-02-14 LAB
ALBUMIN SERPL ELPH-MCNC: 4.3 G/DL — SIGNIFICANT CHANGE UP (ref 3.3–5)
ALP SERPL-CCNC: 58 U/L — SIGNIFICANT CHANGE UP (ref 40–120)
ALT FLD-CCNC: 18 U/L — SIGNIFICANT CHANGE UP (ref 4–33)
APTT BLD: 27.8 SEC — SIGNIFICANT CHANGE UP (ref 27.5–37.4)
AST SERPL-CCNC: 20 U/L — SIGNIFICANT CHANGE UP (ref 4–32)
BASOPHILS # BLD AUTO: 0.06 K/UL — SIGNIFICANT CHANGE UP (ref 0–0.2)
BASOPHILS NFR BLD AUTO: 0.8 % — SIGNIFICANT CHANGE UP (ref 0–2)
BILIRUB SERPL-MCNC: 0.3 MG/DL — SIGNIFICANT CHANGE UP (ref 0.2–1.2)
BUN SERPL-MCNC: 38 MG/DL — HIGH (ref 7–23)
CALCIUM SERPL-MCNC: 9.3 MG/DL — SIGNIFICANT CHANGE UP (ref 8.4–10.5)
CHLORIDE SERPL-SCNC: 100 MMOL/L — SIGNIFICANT CHANGE UP (ref 98–107)
CO2 SERPL-SCNC: 27 MMOL/L — SIGNIFICANT CHANGE UP (ref 22–31)
CREAT SERPL-MCNC: 1.01 MG/DL — SIGNIFICANT CHANGE UP (ref 0.5–1.3)
EOSINOPHIL # BLD AUTO: 0.27 K/UL — SIGNIFICANT CHANGE UP (ref 0–0.5)
EOSINOPHIL NFR BLD AUTO: 3.6 % — SIGNIFICANT CHANGE UP (ref 0–6)
GLUCOSE SERPL-MCNC: 116 MG/DL — HIGH (ref 70–99)
HCT VFR BLD CALC: 37.4 % — SIGNIFICANT CHANGE UP (ref 34.5–45)
HGB BLD-MCNC: 11.9 G/DL — SIGNIFICANT CHANGE UP (ref 11.5–15.5)
IMM GRANULOCYTES # BLD AUTO: 0.03 # — SIGNIFICANT CHANGE UP
IMM GRANULOCYTES NFR BLD AUTO: 0.4 % — SIGNIFICANT CHANGE UP (ref 0–1.5)
INR BLD: 0.93 — SIGNIFICANT CHANGE UP (ref 0.88–1.17)
LYMPHOCYTES # BLD AUTO: 1.59 K/UL — SIGNIFICANT CHANGE UP (ref 1–3.3)
LYMPHOCYTES # BLD AUTO: 21.2 % — SIGNIFICANT CHANGE UP (ref 13–44)
MCHC RBC-ENTMCNC: 30 PG — SIGNIFICANT CHANGE UP (ref 27–34)
MCHC RBC-ENTMCNC: 31.8 % — LOW (ref 32–36)
MCV RBC AUTO: 94.2 FL — SIGNIFICANT CHANGE UP (ref 80–100)
MONOCYTES # BLD AUTO: 0.86 K/UL — SIGNIFICANT CHANGE UP (ref 0–0.9)
MONOCYTES NFR BLD AUTO: 11.5 % — SIGNIFICANT CHANGE UP (ref 2–14)
NEUTROPHILS # BLD AUTO: 4.69 K/UL — SIGNIFICANT CHANGE UP (ref 1.8–7.4)
NEUTROPHILS NFR BLD AUTO: 62.5 % — SIGNIFICANT CHANGE UP (ref 43–77)
NRBC # FLD: 0 — SIGNIFICANT CHANGE UP
PLATELET # BLD AUTO: 226 K/UL — SIGNIFICANT CHANGE UP (ref 150–400)
PMV BLD: 10.9 FL — SIGNIFICANT CHANGE UP (ref 7–13)
POTASSIUM SERPL-MCNC: 5 MMOL/L — SIGNIFICANT CHANGE UP (ref 3.5–5.3)
POTASSIUM SERPL-SCNC: 5 MMOL/L — SIGNIFICANT CHANGE UP (ref 3.5–5.3)
PROT SERPL-MCNC: 6.7 G/DL — SIGNIFICANT CHANGE UP (ref 6–8.3)
PROTHROM AB SERPL-ACNC: 10.3 SEC — SIGNIFICANT CHANGE UP (ref 9.8–13.1)
RBC # BLD: 3.97 M/UL — SIGNIFICANT CHANGE UP (ref 3.8–5.2)
RBC # FLD: 13 % — SIGNIFICANT CHANGE UP (ref 10.3–14.5)
SODIUM SERPL-SCNC: 140 MMOL/L — SIGNIFICANT CHANGE UP (ref 135–145)
WBC # BLD: 7.5 K/UL — SIGNIFICANT CHANGE UP (ref 3.8–10.5)
WBC # FLD AUTO: 7.5 K/UL — SIGNIFICANT CHANGE UP (ref 3.8–10.5)

## 2018-02-14 PROCEDURE — 73502 X-RAY EXAM HIP UNI 2-3 VIEWS: CPT | Mod: 26,LT

## 2018-02-14 PROCEDURE — 73080 X-RAY EXAM OF ELBOW: CPT | Mod: 26,LT

## 2018-02-14 PROCEDURE — 12345: CPT | Mod: NC

## 2018-02-14 PROCEDURE — 99285 EMERGENCY DEPT VISIT HI MDM: CPT | Mod: GC

## 2018-02-14 PROCEDURE — 70450 CT HEAD/BRAIN W/O DYE: CPT | Mod: 26

## 2018-02-14 PROCEDURE — 73140 X-RAY EXAM OF FINGER(S): CPT | Mod: 26,LT

## 2018-02-14 NOTE — ED PROVIDER NOTE - PSH
Artificial pacemaker  2016 MARLEN, [facility]  H/O abdominal hysterectomy    H/O angioplasty  PERIPHERAL WITH 1 STENT  S/P tonsillectomy  as child

## 2018-02-14 NOTE — ED PROVIDER NOTE - OBJECTIVE STATEMENT
89yo female with pmh of HTN, HLD, DM, PVD, CKD, hypothyroidism, s/p PPM on plavix and asa presents s/p fall. Pt states she was at her new PMD's office getting blood work done when she was leaving she turned around tripped and fell onto her left side and the prone. Pt states she hit the left side of her body first, denies loc. PT denies any other preceding symptoms such as palp/sob/cp, dizziness, ha, abd pain/n/v. Pt left base thumb pain. Pt denies ha and all other symptoms.

## 2018-02-14 NOTE — ED PROVIDER NOTE - MUSCULOSKELETAL, MLM
Spine appears normal, range of motion is not limited, mild tenderness at the first meta carpal no snuff box tenderness, from of hand and strength 5/5

## 2018-02-14 NOTE — ED ADULT TRIAGE NOTE - CHIEF COMPLAINT QUOTE
Patient brought to ER from MD office by EMS after a mechanical fall in waiting room. Pt is on Plavix and has no complaints at all. No bruising but patient hit the back and left side of head.

## 2018-02-14 NOTE — ED ADULT NURSE REASSESSMENT NOTE - NS ED NURSE REASSESS COMMENT FT1
Patient discharged by MD. No signs of distress at this time. Patient ambulatory. Discharge instructions were provided. Swapna MORENO

## 2018-02-14 NOTE — ED PROVIDER NOTE - ATTENDING CONTRIBUTION TO CARE
KENDALL Attending Note - Dr. Gauthier 88yo female with pmh of HTN, HLD, DM, PVD, CKD, hypothyroidism, s/p PPM on plavix and asa presents s/p mechanical fall with c/c of pain of left thumb, elbow, hip and scalp.   PE: pt is alert and oriented, perrl, ent normal, membranes are moist, neck supple. no lymphadenopathy or thyroid enlargement, No JVD.  Chest clear to P&A, Heart- reg rhythm without murmur, rubs or gallops, radial pulses equal bilaterally.  Abd is soft, non-tender, Bowel sounds are active. no mass or organomegaly. : No CVA tenderness. Neuro:  Pt alert and oriented x 3. Perrl    Distal neurosensory is intact. Motor function is 5/5 strength bilaterally.  No focal deficits. Extremities:  No edema.  Skin: warm and dry.  Impression: Mechanical fall with pain of left thumb, elbow, hip and head   Plan: labs, x-rays to R/O fractures and ct scan of head to R/O bleed.

## 2018-02-14 NOTE — ED ADULT NURSE REASSESSMENT NOTE - NS ED NURSE REASSESS COMMENT FT1
Pt a&ox3, respirations equal and unlabored, in NAD. Pt denies headache of vision changes. Pt pending CT of head. Developing ecchymosis noted to L elbow and left buttock. MD Park made aware, pt transported to XR. Pt a&ox3, respirations equal and unlabored, in NAD. Pt denies headache or vision changes. Pt pending CT of head. Developing ecchymosis noted to L elbow and left buttock. MD Park made aware, pt transported to XR.

## 2018-02-14 NOTE — ED PROVIDER NOTE - MEDICAL DECISION MAKING DETAILS
88yo female with pmh of HTN, HLD, DM, PVD, CKD, hypothyroidism, s/p PPM on plavix and asa presents s/p fall - r/o ICH

## 2018-02-14 NOTE — ED ADULT NURSE NOTE - OBJECTIVE STATEMENT
pt A&O x3 brought in by ambulance from PCP office for fall. pt states she turned around and feel face forward, as per daughter pt legs were behind her and pt was laying face down, denies LOC, cp sob, cough,n/v. no bruising or bleeding noted, pt has pacemaker in left chest wall. labs sent, report given to primary RN Jacquie

## 2018-06-21 ENCOUNTER — APPOINTMENT (OUTPATIENT)
Dept: VASCULAR SURGERY | Facility: CLINIC | Age: 83
End: 2018-06-21

## 2018-07-16 PROBLEM — I73.9 PERIPHERAL VASCULAR DISEASE, UNSPECIFIED: Chronic | Status: ACTIVE | Noted: 2017-10-05

## 2019-01-17 PROBLEM — E03.9 HYPOTHYROIDISM, UNSPECIFIED: Chronic | Status: ACTIVE | Noted: 2017-10-05

## 2019-01-17 PROBLEM — I10 ESSENTIAL (PRIMARY) HYPERTENSION: Chronic | Status: ACTIVE | Noted: 2017-10-05

## 2019-01-17 PROBLEM — I49.9 CARDIAC ARRHYTHMIA, UNSPECIFIED: Chronic | Status: ACTIVE | Noted: 2017-10-05

## 2019-03-05 ENCOUNTER — APPOINTMENT (OUTPATIENT)
Dept: ENDOCRINOLOGY | Facility: CLINIC | Age: 84
End: 2019-03-05
Payer: MEDICARE

## 2019-03-05 VITALS
SYSTOLIC BLOOD PRESSURE: 122 MMHG | BODY MASS INDEX: 29.49 KG/M2 | HEART RATE: 76 BPM | WEIGHT: 151 LBS | DIASTOLIC BLOOD PRESSURE: 58 MMHG | OXYGEN SATURATION: 96 %

## 2019-03-05 DIAGNOSIS — Z82.49 FAMILY HISTORY OF ISCHEMIC HEART DISEASE AND OTHER DISEASES OF THE CIRCULATORY SYSTEM: ICD-10-CM

## 2019-03-05 DIAGNOSIS — Z77.22 CONTACT WITH AND (SUSPECTED) EXPOSURE TO ENVIRONMENTAL TOBACCO SMOKE (ACUTE) (CHRONIC): ICD-10-CM

## 2019-03-05 PROCEDURE — 76536 US EXAM OF HEAD AND NECK: CPT

## 2019-03-05 PROCEDURE — 99204 OFFICE O/P NEW MOD 45 MIN: CPT | Mod: 25

## 2019-03-05 RX ORDER — ASPIRIN 81 MG/1
81 TABLET, COATED ORAL
Refills: 0 | Status: ACTIVE | COMMUNITY

## 2019-03-05 RX ORDER — IRBESARTAN 300 MG/1
300 TABLET ORAL
Refills: 0 | Status: ACTIVE | COMMUNITY

## 2019-03-05 RX ORDER — VALSARTAN 320 MG/1
320 TABLET ORAL
Refills: 0 | Status: COMPLETED | COMMUNITY
End: 2019-03-05

## 2019-03-05 RX ORDER — ASPIRIN 325 MG/1
325 TABLET, FILM COATED ORAL
Refills: 0 | Status: COMPLETED | COMMUNITY
End: 2019-03-05

## 2019-03-05 NOTE — DATA REVIEWED
[FreeTextEntry1] : Thyroid Ultrasound Report 3/5/19:\par \par Technique: multiple real time longitudinal and transverse images were obtained using a high resolution ultrasound with a linear transducer, Medium e 2008 model, 10-12 MHz frequencies. All measurements will be reported as longitudinal x sue-posterior x transverse. \par Comparison: None. \par \par Indication: multinodular goiter. \par \par Findings: \par The right thyroid lobe measures 4.21 x 1.17 x 1.82 cm. The left thyroid lobe measures 5.12 x 1.75 x 1.77 cm. The isthmus measures 0.20 cm. \par The right thyroid lobe has a heterogeneous parenchyma. \par The left thyroid lobe has a heterogeneous parenchyma . \par  \par In the left mid pole there is a  cyst. It measures 1.43 x 1.25 x 0.98 cm. The nodule is round in shape and the border is smooth. The nodule does not have a halo. It demonstrates no calcification. It has no vascularity. \par \par In the left upper pole there is a  cyst. It measures 0.61 x 0.61 x 0.49 cm. The nodule is round in shape and the border is smooth. The nodule does not have a halo. It demonstrates no calcifications. It has no vascularity. \par \par In the left lower pole there is a  mixed, predominantly solid. It measures 1.06 x 0.94 x 1.02 cm. The nodule is round in shape and the border is smooth. The nodule does not have a halo. It demonstrates no calcifications. It has peripheral vascularity. \par \par In the isthmus there is a  cyst. It measures 0.71 x 0.53 x 0.68 cm. The nodule is ovoid in shape and the border is smooth. The nodule does not have a halo. It demonstrates no calcifications. The cyst has "cat eye" artifact. \par \par Labs 9/2017:\par Cr 1.36\par Glucose 04

## 2019-03-05 NOTE — HISTORY OF PRESENT ILLNESS
[FreeTextEntry1] : 89 y/o F w/ hx of hypothyroidism dx in 2008 on routine lab work. No hx of thyroid surgery. No hx of radiation to the head or neck. Granddaughters w/ Hashimoto's and Graves'. Synthroid was started and has been on stable dose since diagnosis at 88 mcg daily. Takes daily in the morning on empty stomach. Adherent without missed doses. Never on lithium or amiodarone. No palpitations, tremors, weight stable, appetite good. +heat intolerance and constipation. +fatigue (wants to sleep all the time). Not sleeping well due to restless leg syndrome. Last TFTs 9/2018 results are not available at this time. \par \par Hx of thyroid cysts. No dysphagia or dysphonia. Last thyroid US in 2018. No hx of FNA.

## 2019-03-05 NOTE — CONSULT LETTER
[Dear  ___] : Dear  [unfilled], [Consult Letter:] : I had the pleasure of evaluating your patient, [unfilled]. [Please see my note below.] : Please see my note below. [Consult Closing:] : Thank you very much for allowing me to participate in the care of this patient.  If you have any questions, please do not hesitate to contact me. [Sincerely,] : Sincerely, [FreeTextEntry2] : Dr. Zeeshan Nguyen\par [address] [facility] [address] [FreeTextEntry3] : Jeramy Diaz D.O.

## 2019-03-05 NOTE — REASON FOR VISIT
[Consultation] : a consultation visit [Family Member] : family member [FreeTextEntry1] : Hypothyroidism; Thyroid Cysts

## 2019-03-05 NOTE — PROCEDURE
[60mo e 2008 model, 10-12 MHz frequencies] : multiple real time longitudinal and transverse images were obtained using a high resolution ultrasound with a linear transducer, 60mo e 2008 model, 10-12 MHz frequencies. All measurements will be reported as longitudinal x sue-posterior x transverse. [None] : None [Multinodular Goiter] : multinodular goiter [] : a heterogeneous parenchyma  [Mid] : mid pole there is a  [No] : does not have a halo [No calcification] : no calcification [Upper] : upper pole there is a  [No vascularity] : no vascularity [Left Thyroid] : left [Lower] : lower pole there is a  [Mixed] : mixed [Predominantly Solid] : predominantly solid [Round] : round in shape [Peripheral vascularity] : peripheral vascularity [Isthmus] : isthmus there is a  [Cyst] : cyst [Ovoid] : ovoid in shape [Smooth] : smooth [No calcifications] : no calcifications ["Cat eye" artifact] : "cat eye" artifact [FreeTextEntry1] : 4.21 x 1.17 x 1.82 [FreeTextEntry5] : 5.12 x 1.75 x 1.77 [FreeTextEntry2] : 0.20 [FreeTextEntry3] : 0.71 x 0.53 x 0.68

## 2019-03-05 NOTE — IMPRESSION
[FreeTextEntry1] : Multinodular goiter with cysts and nodule on the left as described above.  [FreeTextEntry2] : Repeat thyroid US in 12-24 months if stable compared to thyroid US from 2018.

## 2019-03-05 NOTE — PHYSICAL EXAM
[Alert] : alert [EOMI] : extra ocular movement intact [No Acute Distress] : no acute distress [Well Nourished] : well nourished [Well Developed] : well developed [Normal Oropharynx] : the oropharynx was normal [Supple] : the neck was supple [No Respiratory Distress] : no respiratory distress [Normal Rate and Effort] : normal respiratory rhythm and effort [No Accessory Muscle Use] : no accessory muscle use [Clear to Auscultation] : lungs were clear to auscultation bilaterally [Normal Rate] : heart rate was normal  [Normal S1, S2] : normal S1 and S2 [Regular Rhythm] : with a regular rhythm [Normal Bowel Sounds] : normal bowel sounds [Not Tender] : non-tender [Soft] : abdomen soft [Not Distended] : not distended [Kyphosis] : kyphosis present [No Stigmata of Cushings Syndrome] : no stigmata of cushings syndrome [No Clubbing, Cyanosis] : no clubbing  or cyanosis of the fingernails [Normal Strength/Tone] : muscle strength and tone were normal [Oriented x3] : oriented to person, place, and time [Normal Affect] : the affect was normal [Normal Mood] : the mood was normal [Acanthosis Nigricans] : no acanthosis nigricans [de-identified] : heterogenous gland [de-identified] : +murmur [de-identified] : +trace ankle edema b/l

## 2019-03-05 NOTE — DATA REVIEWED
[FreeTextEntry1] : Thyroid Ultrasound Report 3/5/19:\par \par Technique: multiple real time longitudinal and transverse images were obtained using a high resolution ultrasound with a linear transducer, [employer] e 2008 model, 10-12 MHz frequencies. All measurements will be reported as longitudinal x sue-posterior x transverse. \par Comparison: None. \par \par Indication: multinodular goiter. \par \par Findings: \par The right thyroid lobe measures 4.21 x 1.17 x 1.82 cm. The left thyroid lobe measures 5.12 x 1.75 x 1.77 cm. The isthmus measures 0.20 cm. \par The right thyroid lobe has a heterogeneous parenchyma. \par The left thyroid lobe has a heterogeneous parenchyma . \par  \par In the left mid pole there is a  cyst. It measures 1.43 x 1.25 x 0.98 cm. The nodule is round in shape and the border is smooth. The nodule does not have a halo. It demonstrates no calcification. It has no vascularity. \par \par In the left upper pole there is a  cyst. It measures 0.61 x 0.61 x 0.49 cm. The nodule is round in shape and the border is smooth. The nodule does not have a halo. It demonstrates no calcifications. It has no vascularity. \par \par In the left lower pole there is a  mixed, predominantly solid. It measures 1.06 x 0.94 x 1.02 cm. The nodule is round in shape and the border is smooth. The nodule does not have a halo. It demonstrates no calcifications. It has peripheral vascularity. \par \par In the isthmus there is a  cyst. It measures 0.71 x 0.53 x 0.68 cm. The nodule is ovoid in shape and the border is smooth. The nodule does not have a halo. It demonstrates no calcifications. The cyst has "cat eye" artifact. \par \par Labs 9/2017:\par Cr 1.36\par Glucose 04

## 2019-03-05 NOTE — PHYSICAL EXAM
[Alert] : alert [EOMI] : extra ocular movement intact [No Acute Distress] : no acute distress [Well Nourished] : well nourished [Well Developed] : well developed [Normal Oropharynx] : the oropharynx was normal [Supple] : the neck was supple [No Respiratory Distress] : no respiratory distress [Normal Rate and Effort] : normal respiratory rhythm and effort [No Accessory Muscle Use] : no accessory muscle use [Clear to Auscultation] : lungs were clear to auscultation bilaterally [Normal Rate] : heart rate was normal  [Normal S1, S2] : normal S1 and S2 [Regular Rhythm] : with a regular rhythm [Normal Bowel Sounds] : normal bowel sounds [Not Tender] : non-tender [Soft] : abdomen soft [Not Distended] : not distended [Kyphosis] : kyphosis present [No Stigmata of Cushings Syndrome] : no stigmata of cushings syndrome [No Clubbing, Cyanosis] : no clubbing  or cyanosis of the fingernails [Normal Strength/Tone] : muscle strength and tone were normal [Oriented x3] : oriented to person, place, and time [Normal Affect] : the affect was normal [Normal Mood] : the mood was normal [Acanthosis Nigricans] : no acanthosis nigricans [de-identified] : heterogenous gland [de-identified] : +murmur [de-identified] : +trace ankle edema b/l

## 2019-03-05 NOTE — REVIEW OF SYSTEMS
[Fatigue] : fatigue [Heat Intolerance] : heat intolerant [All other systems negative] : All other systems negative [Recent Weight Gain (___ Lbs)] : no recent weight gain [Recent Weight Loss (___ Lbs)] : no recent weight loss [Dysphagia] : no dysphagia [Dysphonia] : no dysphonia [Neck Pain] : no neck pain [Chest Pain] : no chest pain [Palpitations] : no palpitations [Shortness Of Breath] : no shortness of breath [Nausea] : no nausea [Vomiting] : no vomiting was observed [Constipation] : no constipation [Diarrhea] : no diarrhea [Polyuria] : no polyuria [FreeTextEntry3] : +cataract [FreeTextEntry8] : Menopausal

## 2019-03-05 NOTE — ASSESSMENT
[FreeTextEntry1] : 89 y/o F w/\par \par 1. Hypothyroidism- Clinically euthyroid on exam. Currently on 88 mcg daily with adherence. Check TFTs today. Will adjust as needed for goal TSH of 1-5. \par \par 2. Multinodular Goiter- Suspect nodules were incidentally noted on workup for carotid artery stenosis in 2004. Thyroid US performed today with left sided cysts and nodule (see results section for full description). Can monitor with thyroid US every 1-2 years. Will need to compare US report from today with 2018 performed at outside radiology facility. \par \par 3. HTN- BP at goal, c/w irbesartan and hydralazine\par \par 4. HLD- c/w statin

## 2019-03-05 NOTE — PROCEDURE
[[employer] e 2008 model, 10-12 MHz frequencies] : multiple real time longitudinal and transverse images were obtained using a high resolution ultrasound with a linear transducer, [employer] e 2008 model, 10-12 MHz frequencies. All measurements will be reported as longitudinal x sue-posterior x transverse. [None] : None [Multinodular Goiter] : multinodular goiter [] : a heterogeneous parenchyma  [Mid] : mid pole there is a  [No] : does not have a halo [No calcification] : no calcification [Upper] : upper pole there is a  [No vascularity] : no vascularity [Left Thyroid] : left [Lower] : lower pole there is a  [Mixed] : mixed [Predominantly Solid] : predominantly solid [Round] : round in shape [Peripheral vascularity] : peripheral vascularity [Isthmus] : isthmus there is a  [Cyst] : cyst [Ovoid] : ovoid in shape [Smooth] : smooth [No calcifications] : no calcifications ["Cat eye" artifact] : "cat eye" artifact [FreeTextEntry1] : 4.21 x 1.17 x 1.82 [FreeTextEntry5] : 5.12 x 1.75 x 1.77 [FreeTextEntry2] : 0.20 [FreeTextEntry3] : 0.71 x 0.53 x 0.68

## 2019-03-08 ENCOUNTER — RESULT REVIEW (OUTPATIENT)
Age: 84
End: 2019-03-08

## 2019-03-08 LAB
25(OH)D3 SERPL-MCNC: 48.1 NG/ML
T4 FREE SERPL-MCNC: 1.3 NG/DL
TSH SERPL-ACNC: 6.68 UIU/ML

## 2019-03-11 ENCOUNTER — RX RENEWAL (OUTPATIENT)
Age: 84
End: 2019-03-11

## 2019-09-09 ENCOUNTER — APPOINTMENT (OUTPATIENT)
Dept: ENDOCRINOLOGY | Facility: CLINIC | Age: 84
End: 2019-09-09
Payer: MEDICARE

## 2019-09-09 VITALS
HEART RATE: 86 BPM | OXYGEN SATURATION: 98 % | DIASTOLIC BLOOD PRESSURE: 64 MMHG | WEIGHT: 151 LBS | SYSTOLIC BLOOD PRESSURE: 120 MMHG | BODY MASS INDEX: 29.64 KG/M2 | HEIGHT: 60 IN

## 2019-09-09 PROCEDURE — 99214 OFFICE O/P EST MOD 30 MIN: CPT

## 2019-09-09 NOTE — REASON FOR VISIT
[Follow-Up: _____] : a [unfilled] follow-up visit [Family Member] : family member [FreeTextEntry1] : Hypothyroidism

## 2019-09-09 NOTE — ASSESSMENT
[FreeTextEntry1] : 89 y/o F w/\par \par 1. Hypothyroidism- Clinically euthyroid on exam. Currently on 88 mcg daily with extra 1/2 tab weekly with adherence. Check TFTs today. Will adjust as needed for goal TSH of 1-5. \par \par 2. Multinodular Goiter- Suspect nodules were incidentally noted on workup for carotid artery stenosis in 2004. Thyroid US performed 3/2019 with left sided cysts and nodule (see results section for full description). Can monitor with thyroid US every 1-2 years. Will need to compare US report with 2018 performed at outside radiology facility. \par \par 3. HTN- BP at goal, c/w irbesartan and hydralazine\par \par 4. HLD- c/w statin

## 2019-09-09 NOTE — PHYSICAL EXAM
[Alert] : alert [No Acute Distress] : no acute distress [Well Nourished] : well nourished [Well Developed] : well developed [EOMI] : extra ocular movement intact [Normal Oropharynx] : the oropharynx was normal [Supple] : the neck was supple [No Respiratory Distress] : no respiratory distress [Normal Rate and Effort] : normal respiratory rhythm and effort [No Accessory Muscle Use] : no accessory muscle use [Clear to Auscultation] : lungs were clear to auscultation bilaterally [Normal Rate] : heart rate was normal  [Normal S1, S2] : normal S1 and S2 [Regular Rhythm] : with a regular rhythm [Normal Bowel Sounds] : normal bowel sounds [Not Tender] : non-tender [Soft] : abdomen soft [Not Distended] : not distended [Kyphosis] : kyphosis present [No Clubbing, Cyanosis] : no clubbing  or cyanosis of the fingernails [No Stigmata of Cushings Syndrome] : no stigmata of cushings syndrome [Normal Strength/Tone] : muscle strength and tone were normal [Oriented x3] : oriented to person, place, and time [Normal Affect] : the affect was normal [Normal Mood] : the mood was normal [Acanthosis Nigricans] : no acanthosis nigricans [de-identified] : heterogenous gland [de-identified] : +murmur [de-identified] : +trace ankle edema b/l

## 2019-09-09 NOTE — DATA REVIEWED
[FreeTextEntry1] : Labs 3/2019:\par TSH 6.68\par Free T4 1.3\par Vit D 48.1\par \par Thyroid Ultrasound Report 3/5/19:\par \par Technique: multiple real time longitudinal and transverse images were obtained using a high resolution ultrasound with a linear transducer, [employer] e 2008 model, 10-12 MHz frequencies. All measurements will be reported as longitudinal x sue-posterior x transverse. \par Comparison: None. \par \par Indication: multinodular goiter. \par \par Findings: \par The right thyroid lobe measures 4.21 x 1.17 x 1.82 cm. The left thyroid lobe measures 5.12 x 1.75 x 1.77 cm. The isthmus measures 0.20 cm. \par The right thyroid lobe has a heterogeneous parenchyma. \par The left thyroid lobe has a heterogeneous parenchyma . \par  \par In the left mid pole there is a  cyst. It measures 1.43 x 1.25 x 0.98 cm. The nodule is round in shape and the border is smooth. The nodule does not have a halo. It demonstrates no calcification. It has no vascularity. \par \par In the left upper pole there is a  cyst. It measures 0.61 x 0.61 x 0.49 cm. The nodule is round in shape and the border is smooth. The nodule does not have a halo. It demonstrates no calcifications. It has no vascularity. \par \par In the left lower pole there is a  mixed, predominantly solid. It measures 1.06 x 0.94 x 1.02 cm. The nodule is round in shape and the border is smooth. The nodule does not have a halo. It demonstrates no calcifications. It has peripheral vascularity. \par \par In the isthmus there is a  cyst. It measures 0.71 x 0.53 x 0.68 cm. The nodule is ovoid in shape and the border is smooth. The nodule does not have a halo. It demonstrates no calcifications. The cyst has "cat eye" artifact. \par \par Labs 9/2017:\par Cr 1.36\par Glucose 04

## 2019-09-09 NOTE — REVIEW OF SYSTEMS
[Fatigue] : fatigue [Heat Intolerance] : heat intolerant [All other systems negative] : All other systems negative [Recent Weight Gain (___ Lbs)] : no recent weight gain [Recent Weight Loss (___ Lbs)] : no recent weight loss [Dysphagia] : no dysphagia [Dysphonia] : no dysphonia [Neck Pain] : no neck pain [Chest Pain] : no chest pain [Palpitations] : no palpitations [Shortness Of Breath] : no shortness of breath [Nausea] : no nausea [Vomiting] : no vomiting was observed [Constipation] : no constipation [Diarrhea] : no diarrhea [Polyuria] : no polyuria [FreeTextEntry2] : +good appetite [FreeTextEntry3] : +cataract [FreeTextEntry8] : Menopausal

## 2019-09-09 NOTE — PROCEDURE
[[employer] e 2008 model, 10-12 MHz frequencies] : multiple real time longitudinal and transverse images were obtained using a high resolution ultrasound with a linear transducer, [employer] e 2008 model, 10-12 MHz frequencies. All measurements will be reported as longitudinal x sue-posterior x transverse. [None] : None [Multinodular Goiter] : multinodular goiter [] : a heterogeneous parenchyma  [Mid] : mid pole there is a  [No] : does not have a halo [No calcification] : no calcification [Upper] : upper pole there is a  [No vascularity] : no vascularity [Left Thyroid] : left [Lower] : lower pole there is a  [Mixed] : mixed [Predominantly Solid] : predominantly solid [Round] : round in shape [Peripheral vascularity] : peripheral vascularity [Isthmus] : isthmus there is a  [Cyst] : cyst [Ovoid] : ovoid in shape [Smooth] : smooth [No] : does not have a halo [No calcifications] : no calcifications ["Cat eye" artifact] : "cat eye" artifact [FreeTextEntry5] : 5.12 x 1.75 x 1.77 [FreeTextEntry1] : 4.21 x 1.17 x 1.82 [FreeTextEntry2] : 0.20 [FreeTextEntry3] : 0.71 x 0.53 x 0.68

## 2019-09-09 NOTE — HISTORY OF PRESENT ILLNESS
[FreeTextEntry1] : 89 y/o F w/ hx of hypothyroidism dx in 2008 on routine lab work. No hx of thyroid surgery. No hx of radiation to the head or neck. Granddaughters w/ Hashimoto's and Graves'. Synthroid was started and had been on stable dose since diagnosis at 88 mcg daily. At last visit, 3/2019, TSH 6.68, recommended increase to extra 1/2 tab weekly which she takes on Mondays. Takes daily in the morning on empty stomach. Adherent without missed doses. Never on lithium or amiodarone. No palpitations, tremors, weight stable, appetite good. +heat intolerance and constipation. +fatigue (wants to sleep all the time). Not sleeping well due to restless leg syndrome. \par \par Hx of thyroid cysts. No dysphagia or dysphonia. Last thyroid US in 3/2019. No hx of FNA.

## 2019-09-10 ENCOUNTER — RESULT REVIEW (OUTPATIENT)
Age: 84
End: 2019-09-10

## 2019-09-10 LAB
T4 FREE SERPL-MCNC: 1.4 NG/DL
TSH SERPL-ACNC: 1.5 UIU/ML

## 2019-09-12 ENCOUNTER — RX RENEWAL (OUTPATIENT)
Age: 84
End: 2019-09-12

## 2020-03-09 ENCOUNTER — APPOINTMENT (OUTPATIENT)
Dept: ENDOCRINOLOGY | Facility: CLINIC | Age: 85
End: 2020-03-09

## 2020-03-09 VITALS
DIASTOLIC BLOOD PRESSURE: 80 MMHG | WEIGHT: 158 LBS | OXYGEN SATURATION: 97 % | HEIGHT: 60 IN | TEMPERATURE: 98.2 F | BODY MASS INDEX: 31.02 KG/M2 | SYSTOLIC BLOOD PRESSURE: 140 MMHG | HEART RATE: 83 BPM

## 2020-03-09 NOTE — ASSESSMENT
[FreeTextEntry1] : 89 y/o F w/\par \par 1. Hypothyroidism- Clinically euthyroid on exam. Currently on 88 mcg daily with extra 1/2 tab weekly with adherence. Check TFTs today. Will adjust as needed for goal TSH of 1-5. \par \par 2. Multinodular Goiter- Suspect nodules were incidentally noted on workup for carotid artery stenosis in 2004. Thyroid US performed 3/2019 with left sided cysts and nodule (see results section for full description). Can monitor with thyroid US every 1-2 years. Will need to compare US report with 2018 performed at outside radiology facility. \par \par 3. HTN- BP at goal, c/w irbesartan and hydralazine\par \par 4. HLD- c/w statin

## 2020-03-09 NOTE — DATA REVIEWED
[FreeTextEntry1] : Labs 3/2019:\par TSH 6.68\par Free T4 1.3\par Vit D 48.1\par \par Thyroid Ultrasound Report 3/5/19:\par \par Technique: multiple real time longitudinal and transverse images were obtained using a high resolution ultrasound with a linear transducer, [employer] e 2008 model, 10-12 MHz frequencies. All measurements will be reported as longitudinal x sue-posterior x transverse. \par Comparison: None. \par \par Indication: multinodular goiter. \par \par Findings: \par The right thyroid lobe measures 4.21 x 1.17 x 1.82 cm. The left thyroid lobe measures 5.12 x 1.75 x 1.77 cm. The isthmus measures 0.20 cm. \par The right thyroid lobe has a heterogeneous parenchyma. \par The left thyroid lobe has a heterogeneous parenchyma . \par  \par In the left mid pole there is a  cyst. It measures 1.43 x 1.25 x 0.98 cm. The nodule is round in shape and the border is smooth. The nodule does not have a halo. It demonstrates no calcification. It has no vascularity. \par \par In the left upper pole there is a  cyst. It measures 0.61 x 0.61 x 0.49 cm. The nodule is round in shape and the border is smooth. The nodule does not have a halo. It demonstrates no calcifications. It has no vascularity. \par \par In the left lower pole there is a  mixed, predominantly solid. It measures 1.06 x 0.94 x 1.02 cm. The nodule is round in shape and the border is smooth. The nodule does not have a halo. It demonstrates no calcifications. It has peripheral vascularity. \par \par In the isthmus there is a  cyst. It measures 0.71 x 0.53 x 0.68 cm. The nodule is ovoid in shape and the border is smooth. The nodule does not have a halo. It demonstrates no calcifications. The cyst has "cat eye" artifact. \par \par Labs 9/2017:\par Cr 1.36\par Glucose 04

## 2020-03-09 NOTE — REVIEW OF SYSTEMS
[Fatigue] : fatigue [Recent Weight Gain (___ Lbs)] : no recent weight gain [Recent Weight Loss (___ Lbs)] : no recent weight loss [Dysphagia] : no dysphagia [Dysphonia] : no dysphonia [Neck Pain] : no neck pain [Chest Pain] : no chest pain [Palpitations] : no palpitations [Shortness Of Breath] : no shortness of breath [Nausea] : no nausea [Vomiting] : no vomiting was observed [Constipation] : no constipation [Diarrhea] : no diarrhea [Polyuria] : no polyuria [Heat Intolerance] : heat intolerant [All other systems negative] : All other systems negative [FreeTextEntry2] : +good appetite [FreeTextEntry3] : +cataract [FreeTextEntry8] : Menopausal

## 2020-03-09 NOTE — PROCEDURE
[[employer] e 2008 model, 10-12 MHz frequencies] : multiple real time longitudinal and transverse images were obtained using a high resolution ultrasound with a linear transducer, [employer] e 2008 model, 10-12 MHz frequencies. All measurements will be reported as longitudinal x sue-posterior x transverse. [None] : None [Multinodular Goiter] : multinodular goiter [] : a heterogeneous parenchyma  [Mid] : mid pole there is a  [No] : does not have a halo [No calcification] : no calcification [Upper] : upper pole there is a  [No vascularity] : no vascularity [Left Thyroid] : left [Lower] : lower pole there is a  [Mixed] : mixed [Predominantly Solid] : predominantly solid [Round] : round in shape [Peripheral vascularity] : peripheral vascularity [Isthmus] : isthmus there is a  [Cyst] : cyst [Ovoid] : ovoid in shape [Smooth] : smooth [No calcifications] : no calcifications ["Cat eye" artifact] : "cat eye" artifact [FreeTextEntry1] : 4.21 x 1.17 x 1.82 [FreeTextEntry5] : 5.12 x 1.75 x 1.77 [FreeTextEntry2] : 0.20 [FreeTextEntry3] : 0.71 x 0.53 x 0.68

## 2020-03-09 NOTE — PHYSICAL EXAM
[Alert] : alert [No Acute Distress] : no acute distress [Well Nourished] : well nourished [Well Developed] : well developed [EOMI] : extra ocular movement intact [Normal Oropharynx] : the oropharynx was normal [Supple] : the neck was supple [No Respiratory Distress] : no respiratory distress [Normal Rate and Effort] : normal respiratory rhythm and effort [No Accessory Muscle Use] : no accessory muscle use [Clear to Auscultation] : lungs were clear to auscultation bilaterally [Normal Rate] : heart rate was normal  [Normal S1, S2] : normal S1 and S2 [Regular Rhythm] : with a regular rhythm [Normal Bowel Sounds] : normal bowel sounds [Not Tender] : non-tender [Soft] : abdomen soft [Not Distended] : not distended [Kyphosis] : kyphosis present [No Stigmata of Cushings Syndrome] : no stigmata of cushings syndrome [No Clubbing, Cyanosis] : no clubbing  or cyanosis of the fingernails [Normal Strength/Tone] : muscle strength and tone were normal [Acanthosis Nigricans] : no acanthosis nigricans [Oriented x3] : oriented to person, place, and time [Normal Affect] : the affect was normal [Normal Mood] : the mood was normal [de-identified] : heterogenous gland [de-identified] : +murmur [de-identified] : +trace ankle edema b/l

## 2020-03-09 NOTE — HISTORY OF PRESENT ILLNESS
[FreeTextEntry1] : 89 y/o F w/ hx of hypothyroidism dx in 2008 on routine lab work. No hx of thyroid surgery. No hx of radiation to the head or neck. Granddaughters w/ Hashimoto's and Graves'. Synthroid was started and had been on stable dose since diagnosis at 88 mcg daily. At last visit, 3/2019, TSH 6.68, recommended increase to extra 1/2 tab weekly which she takes on Mondays. Takes daily in the morning on empty stomach. Adherent without missed doses. Never on lithium or amiodarone. No palpitations, tremors, weight stable, appetite good. +heat intolerance and constipation. +fatigue (wants to sleep all the time). Not sleeping well due to restless leg syndrome. \par \par Hx of thyroid cysts. No dysphagia or dysphonia. Last thyroid US in 3/2019. No hx of FNA.

## 2020-03-10 ENCOUNTER — RX RENEWAL (OUTPATIENT)
Age: 85
End: 2020-03-10

## 2020-03-12 NOTE — DISCHARGE NOTE ADULT - VISION (WITH CORRECTIVE LENSES IF THE PATIENT USUALLY WEARS THEM):
Urine obtained and sent to lab  Tech at bedside for EKG and orthostatics   Partially impaired: cannot see medication labels or newsprint, but can see obstacles in path, and the surrounding layout; can count fingers at arm's length

## 2020-04-07 ENCOUNTER — APPOINTMENT (OUTPATIENT)
Dept: ENDOCRINOLOGY | Facility: CLINIC | Age: 85
End: 2020-04-07

## 2020-07-01 ENCOUNTER — APPOINTMENT (OUTPATIENT)
Dept: ENDOCRINOLOGY | Facility: CLINIC | Age: 85
End: 2020-07-01
Payer: MEDICARE

## 2020-07-01 VITALS
WEIGHT: 160 LBS | TEMPERATURE: 98.4 F | DIASTOLIC BLOOD PRESSURE: 65 MMHG | OXYGEN SATURATION: 99 % | HEIGHT: 60 IN | BODY MASS INDEX: 31.41 KG/M2 | RESPIRATION RATE: 20 BRPM | HEART RATE: 90 BPM | SYSTOLIC BLOOD PRESSURE: 114 MMHG

## 2020-07-01 PROCEDURE — 99214 OFFICE O/P EST MOD 30 MIN: CPT

## 2020-07-01 NOTE — REVIEW OF SYSTEMS
[Fatigue] : fatigue [All other systems negative] : All other systems negative [Recent Weight Gain (___ Lbs)] : recent weight gain: [unfilled] lbs [Dysphagia] : no dysphagia [Visual Field Defect] : no visual field defect [Dysphonia] : no dysphonia [Chest Pain] : no chest pain [Palpitations] : no palpitations [Nausea] : no nausea [Lower Ext Edema] : lower extremity edema [Shortness Of Breath] : no shortness of breath [Vomiting] : no vomiting [Polyuria] : no polyuria [Joint Pain] : no joint pain [Tremors] : no tremors [Headaches] : no headaches [Polydipsia] : no polydipsia

## 2020-07-01 NOTE — DATA REVIEWED
[FreeTextEntry1] : Labs 9/2019:\par TSH 1.50\par Free T4 1.4\par \par Labs 3/2019:\par TSH 6.68\par Free T4 1.3\par Vit D 48.1\par \par Thyroid Ultrasound Report 3/5/19:\par \par Technique: multiple real time longitudinal and transverse images were obtained using a high resolution ultrasound with a linear transducer, [employer] e 2008 model, 10-12 MHz frequencies. All measurements will be reported as longitudinal x sue-posterior x transverse. \par Comparison: None. \par \par Indication: multinodular goiter. \par \par Findings: \par The right thyroid lobe measures 4.21 x 1.17 x 1.82 cm. The left thyroid lobe measures 5.12 x 1.75 x 1.77 cm. The isthmus measures 0.20 cm. \par The right thyroid lobe has a heterogeneous parenchyma. \par The left thyroid lobe has a heterogeneous parenchyma . \par  \par In the left mid pole there is a  cyst. It measures 1.43 x 1.25 x 0.98 cm. The nodule is round in shape and the border is smooth. The nodule does not have a halo. It demonstrates no calcification. It has no vascularity. \par \par In the left upper pole there is a  cyst. It measures 0.61 x 0.61 x 0.49 cm. The nodule is round in shape and the border is smooth. The nodule does not have a halo. It demonstrates no calcifications. It has no vascularity. \par \par In the left lower pole there is a  mixed, predominantly solid. It measures 1.06 x 0.94 x 1.02 cm. The nodule is round in shape and the border is smooth. The nodule does not have a halo. It demonstrates no calcifications. It has peripheral vascularity. \par \par In the isthmus there is a  cyst. It measures 0.71 x 0.53 x 0.68 cm. The nodule is ovoid in shape and the border is smooth. The nodule does not have a halo. It demonstrates no calcifications. The cyst has "cat eye" artifact. \par \par Labs 9/2017:\par Cr 1.36\par Glucose 04

## 2020-07-01 NOTE — HISTORY OF PRESENT ILLNESS
[FreeTextEntry1] : 89 y/o F w/ hx of hypothyroidism dx in 2008 on routine lab work. No hx of thyroid surgery. No hx of radiation to the head or neck. Granddaughters w/ Hashimoto's and Graves'. Synthroid was started and had been on stable dose since diagnosis at 88 mcg daily. In March of 2019 TSH 6.68, recommended increase to extra 1/2 tab weekly which she takes on Mondays. Repeat TSH 1.50 (9/2019). Takes daily in the morning on empty stomach. Adherent without missed doses. Never on lithium or amiodarone. No palpitations, tremors, weight stable, appetite good. +weight gain +heat intolerance and constipation. +fatigue (wants to sleep all the time). Not sleeping well due to restless leg syndrome. \par \par Hx of thyroid cysts. No dysphagia or dysphonia. Last thyroid US in 3/2019. No hx of FNA.

## 2020-07-01 NOTE — REASON FOR VISIT
[Follow - Up] : a follow-up visit [Hypothyroidism] : hypothyroidism [Thyroid nodule/ MNG] : thyroid nodule/ MNG [Family Member] : family member

## 2020-07-01 NOTE — PROCEDURE
[[employer] e 2008 model, 10-12 MHz frequencies] : multiple real time longitudinal and transverse images were obtained using a high resolution ultrasound with a linear transducer, [employer] e 2008 model, 10-12 MHz frequencies. All measurements will be reported as longitudinal x sue-posterior x transverse. [None] : None [Multinodular Goiter] : multinodular goiter [] : a heterogeneous parenchyma  [Mid] : mid pole there is a  [No calcification] : no calcification [No] : does not have a halo [Upper] : upper pole there is a  [Left Thyroid] : left [Lower] : lower pole there is a  [No vascularity] : no vascularity [Mixed] : mixed [Round] : round in shape [Predominantly Solid] : predominantly solid [Peripheral vascularity] : peripheral vascularity [Isthmus] : isthmus there is a  [Cyst] : cyst [Smooth] : smooth [Ovoid] : ovoid in shape [No calcifications] : no calcifications ["Cat eye" artifact] : "cat eye" artifact [FreeTextEntry5] : 5.12 x 1.75 x 1.77 [FreeTextEntry1] : 4.21 x 1.17 x 1.82 [FreeTextEntry2] : 0.20 [FreeTextEntry3] : 0.71 x 0.53 x 0.68

## 2020-07-01 NOTE — ASSESSMENT
[FreeTextEntry1] : 89 y/o F w/\par \par 1. Hypothyroidism- Clinically euthyroid on exam. Currently on 88 mcg daily with extra 1/2 tab weekly with adherence. Check TFTs today. Will adjust as needed for goal TSH of 1-5. \par \par 2. Multinodular Goiter- Suspect nodules were incidentally noted on workup for carotid artery stenosis in 2004. Thyroid US performed 3/2019 with left sided cysts and nodule (see results section for full description). Can monitor with thyroid US every 1-2 years. Will need to compare US report with 2018 performed at outside radiology facility. \par \par 3. HTN- BP at goal, c/w irbesartan and hydralazine\par \par 4. HLD- c/w statin

## 2020-07-01 NOTE — PHYSICAL EXAM
[Alert] : alert [Well Nourished] : well nourished [Healthy Appearance] : healthy appearance [No Acute Distress] : no acute distress [Well Developed] : well developed [EOMI] : extra ocular movement intact [No Accessory Muscle Use] : no accessory muscle use [No Proptosis] : no proptosis [No Respiratory Distress] : no respiratory distress [Clear to Auscultation] : lungs were clear to auscultation bilaterally [Normal Rate and Effort] : normal respiratory rate and effort [Normal S1, S2] : normal S1 and S2 [Regular Rhythm] : with a regular rhythm [Normal Rate] : heart rate was normal [Soft] : abdomen soft [Oriented x3] : oriented to person, place, and time [No Stigmata of Cushings Syndrome] : no stigmata of Cushings Syndrome [Normal Affect] : the affect was normal [Normal Mood] : the mood was normal [de-identified] : +LE edema left > right [de-identified] : +Heterogenous gland

## 2020-07-02 LAB
25(OH)D3 SERPL-MCNC: 49.5 NG/ML
ALBUMIN SERPL ELPH-MCNC: 4.4 G/DL
ALP BLD-CCNC: 54 U/L
ALT SERPL-CCNC: 17 U/L
ANION GAP SERPL CALC-SCNC: 14 MMOL/L
AST SERPL-CCNC: 18 U/L
BILIRUB SERPL-MCNC: 0.4 MG/DL
BUN SERPL-MCNC: 42 MG/DL
CALCIUM SERPL-MCNC: 10 MG/DL
CHLORIDE SERPL-SCNC: 98 MMOL/L
CHOLEST SERPL-MCNC: 154 MG/DL
CHOLEST/HDLC SERPL: 3.4 RATIO
CO2 SERPL-SCNC: 25 MMOL/L
CREAT SERPL-MCNC: 1.21 MG/DL
ESTIMATED AVERAGE GLUCOSE: 120 MG/DL
GLUCOSE SERPL-MCNC: 108 MG/DL
HBA1C MFR BLD HPLC: 5.8 %
HDLC SERPL-MCNC: 45 MG/DL
LDLC SERPL CALC-MCNC: 78 MG/DL
POTASSIUM SERPL-SCNC: 4.9 MMOL/L
PROT SERPL-MCNC: 6.3 G/DL
SODIUM SERPL-SCNC: 137 MMOL/L
T4 FREE SERPL-MCNC: 1.3 NG/DL
TRIGL SERPL-MCNC: 154 MG/DL
TSH SERPL-ACNC: 2.11 UIU/ML

## 2020-09-11 ENCOUNTER — RX RENEWAL (OUTPATIENT)
Age: 85
End: 2020-09-11

## 2021-03-05 ENCOUNTER — RX RENEWAL (OUTPATIENT)
Age: 86
End: 2021-03-05

## 2021-06-26 ENCOUNTER — TRANSCRIPTION ENCOUNTER (OUTPATIENT)
Age: 86
End: 2021-06-26

## 2021-07-06 ENCOUNTER — APPOINTMENT (OUTPATIENT)
Dept: ENDOCRINOLOGY | Facility: CLINIC | Age: 86
End: 2021-07-06

## 2021-08-17 ENCOUNTER — APPOINTMENT (OUTPATIENT)
Dept: ENDOCRINOLOGY | Facility: CLINIC | Age: 86
End: 2021-08-17
Payer: MEDICARE

## 2021-08-17 VITALS
HEART RATE: 91 BPM | DIASTOLIC BLOOD PRESSURE: 63 MMHG | OXYGEN SATURATION: 95 % | TEMPERATURE: 98.4 F | HEIGHT: 60 IN | SYSTOLIC BLOOD PRESSURE: 122 MMHG | WEIGHT: 148 LBS | BODY MASS INDEX: 29.06 KG/M2

## 2021-08-17 PROCEDURE — 99214 OFFICE O/P EST MOD 30 MIN: CPT

## 2021-08-17 NOTE — DATA REVIEWED
[FreeTextEntry1] : Focused Thyroid US 8/17/2021: In the right mid pole there is a  cyst. It measures 1.4 x 1.0 x 0.86 cm. The nodule is round in shape and the border is smooth. The nodule does not have a halo. It demonstrates no calcification. It has no vascularity. \par Additional subcentimeter nodules b/l unchanged. \par \par Labs 7/2021:\par TSH 3\par \par Labs 5/2021:\par TSH 6.3\par \par Labs 9/2019:\par TSH 1.50\par Free T4 1.4\par \par Labs 3/2019:\par TSH 6.68\par Free T4 1.3\par Vit D 48.1\par \par Thyroid Ultrasound Report 3/5/19:\par \par Technique: multiple real time longitudinal and transverse images were obtained using a high resolution ultrasound with a linear transducer, [employer] e 2008 model, 10-12 MHz frequencies. All measurements will be reported as longitudinal x sue-posterior x transverse. \par Comparison: None. \par \par Indication: multinodular goiter. \par \par Findings: \par The right thyroid lobe measures 4.21 x 1.17 x 1.82 cm. The left thyroid lobe measures 5.12 x 1.75 x 1.77 cm. The isthmus measures 0.20 cm. \par The right thyroid lobe has a heterogeneous parenchyma. \par The left thyroid lobe has a heterogeneous parenchyma . \par  \par In the left mid pole there is a  cyst. It measures 1.43 x 1.25 x 0.98 cm. The nodule is round in shape and the border is smooth. The nodule does not have a halo. It demonstrates no calcification. It has no vascularity. \par \par In the left upper pole there is a  cyst. It measures 0.61 x 0.61 x 0.49 cm. The nodule is round in shape and the border is smooth. The nodule does not have a halo. It demonstrates no calcifications. It has no vascularity. \par \par In the left lower pole there is a  mixed, predominantly solid. It measures 1.06 x 0.94 x 1.02 cm. The nodule is round in shape and the border is smooth. The nodule does not have a halo. It demonstrates no calcifications. It has peripheral vascularity. \par \par In the isthmus there is a  cyst. It measures 0.71 x 0.53 x 0.68 cm. The nodule is ovoid in shape and the border is smooth. The nodule does not have a halo. It demonstrates no calcifications. The cyst has "cat eye" artifact. \par \par Labs 9/2017:\par Cr 1.36\par Glucose 04

## 2021-08-17 NOTE — ASSESSMENT
[FreeTextEntry1] : 89 y/o F w/\par \par 1. Hypothyroidism- Clinically euthyroid on exam. Currently on 100 mcg daily. Chemically euthyroid as of 7/2021. Will adjust as needed for goal TSH of 1-5. \par \par 2. Multinodular Goiter- Suspect nodules were incidentally noted on workup for carotid artery stenosis in 2004. Thyroid US performed 3/2019 with right and left sided cysts and nodules (see results section for full description). Can monitor with thyroid US every 1-2 years. Will need to compare US report with 2018 performed at outside radiology facility. Focused thyroid US performed today stable.\par \par 3. HTN- BP at goal, c/w irbesartan and hydralazine\par \par 4. HLD- c/w statin\par \par Prep time with review of labs and interval progress notes and consultations 5 min\par Discussion with patient regarding hypothyroidism with updated changes made by PCP and thyroid nodules with treatment options and goals of care answering all patients questions and addressing all concerns 15 min\par Focused Thyroid US performed at today's visit with discussion with patient and daughter 10 min\par Post-visit completion charting and review 5 min\par Total Time 35 min\par \par Specifically causes, evaluation, treatment options, risks, complications, and benefits of available therapies were discussed. Questions were answered.\par \par The submitted E/M billing level for this visit reflects the total time spent on the day of the visit including face-to-face time spent with the patient, non-face-to-face review of medical records and relevant information, documentation, and asynchronous communication with the patient after a visit via phone, email, or patient’s EHR portal after the visit. \par The medical records reviewed are either scanned into the chart or reviewed with the patient using a patient’s electronic medical records portal for patients with records not available to [facility] via electronic transmission platforms from other institutions and labs. \par Time spend counseling and performing coordination of care was also included in determining the appropriate EM billing level.\par \par I have reviewed and verified information regarding the chief complaint and history recorded by the ancillary staff and/or the patient. I have independently reviewed and interpreted tests performed by other physicians and facilities as necessary. \par \par I have discussed with the patient differential diagnosis, reason for auxiliary tests if ordered, risks, benefits, alternatives, and complications of each form of therapy were discussed.\par

## 2021-08-17 NOTE — REVIEW OF SYSTEMS
[Fatigue] : fatigue [Lower Ext Edema] : lower extremity edema [All other systems negative] : All other systems negative [Recent Weight Gain (___ Lbs)] : no recent weight gain [Recent Weight Loss (___ Lbs)] : recent weight loss: [unfilled] lbs [Visual Field Defect] : no visual field defect [Dysphagia] : no dysphagia [Dysphonia] : no dysphonia [Chest Pain] : no chest pain [Palpitations] : no palpitations [Shortness Of Breath] : no shortness of breath [Nausea] : no nausea [Vomiting] : no vomiting [Polyuria] : no polyuria [Joint Pain] : no joint pain [Headaches] : no headaches [Tremors] : no tremors [Polydipsia] : no polydipsia

## 2021-08-17 NOTE — PROCEDURE
[[employer] e 2008 model, 10-12 MHz frequencies] : multiple real time longitudinal and transverse images were obtained using a high resolution ultrasound with a linear transducer, [employer] e 2008 model, 10-12 MHz frequencies. All measurements will be reported as longitudinal x sue-posterior x transverse. [None] : None [Multinodular Goiter] : multinodular goiter [] : a heterogeneous parenchyma  [Mid] : mid pole there is a  [No] : does not have a halo [No calcification] : no calcification [Upper] : upper pole there is a  [No vascularity] : no vascularity [Left Thyroid] : left [Lower] : lower pole there is a  [Mixed] : mixed [Predominantly Solid] : predominantly solid [Round] : round in shape [Peripheral vascularity] : peripheral vascularity [Isthmus] : isthmus there is a  [Cyst] : cyst [Ovoid] : ovoid in shape [Smooth] : smooth [No calcifications] : no calcifications ["Cat eye" artifact] : "cat eye" artifact [FreeTextEntry1] : 4.21 x 1.17 x 1.82 [FreeTextEntry5] : 5.12 x 1.75 x 1.77 [FreeTextEntry2] : 0.20 [FreeTextEntry3] : 0.71 x 0.53 x 0.68

## 2021-08-17 NOTE — HISTORY OF PRESENT ILLNESS
[FreeTextEntry1] : 89 y/o F w/ hx of hypothyroidism dx in 2008 on routine lab work. No hx of thyroid surgery. No hx of radiation to the head or neck. Granddaughters w/ Hashimoto's and Graves'. Synthroid was started and had been on stable dose since diagnosis at 88 mcg daily until March of 2019 TSH 6.68, recommended increase to extra 1/2 tab weekly which she was taking on Mondays. Was chemically euthyroid until 5/2021 with TSH of 6.3 and Free T4 of 1.1. PCP recommended increase to 100 mcg daily which she has been taking since then. Takes daily in the morning on empty stomach. Adherent without missed doses. Never on lithium or amiodarone. No palpitations, tremors, weight stable, appetite good. +weight gain +heat intolerance and constipation. +fatigue. Not sleeping well due to restless leg syndrome. \par \par Hx of thyroid cysts. \par Thyroid US performed 7/2020: In the left mid pole there is a  cyst. It measures 1.43 x 1.25 x 0.98 cm. The nodule is round in shape and the border is smooth. The nodule does not have a halo. It demonstrates no calcification. It has no vascularity. \par \par In the left upper pole there is a  cyst. It measures 0.61 x 0.61 x 0.49 cm. The nodule is round in shape and the border is smooth. The nodule does not have a halo. It demonstrates no calcifications. It has no vascularity. \par \par In the left lower pole there is a  mixed, predominantly solid. It measures 1.06 x 0.94 x 1.02 cm. The nodule is round in shape and the border is smooth. The nodule does not have a halo. It demonstrates no calcifications. It has peripheral vascularity. \par \par In the isthmus there is a  cyst. It measures 0.71 x 0.53 x 0.68 cm. The nodule is ovoid in shape and the border is smooth. The nodule does not have a halo. It demonstrates no calcifications. The cyst has "cat eye" artifact. No dysphagia or dysphonia. No hx of FNA.

## 2021-08-17 NOTE — PHYSICAL EXAM
[Alert] : alert [Well Nourished] : well nourished [Healthy Appearance] : healthy appearance [No Acute Distress] : no acute distress [Well Developed] : well developed [EOMI] : extra ocular movement intact [No Proptosis] : no proptosis [No Respiratory Distress] : no respiratory distress [No Accessory Muscle Use] : no accessory muscle use [Normal Rate and Effort] : normal respiratory rate and effort [Clear to Auscultation] : lungs were clear to auscultation bilaterally [Normal S1, S2] : normal S1 and S2 [Normal Rate] : heart rate was normal [Regular Rhythm] : with a regular rhythm [Soft] : abdomen soft [No Stigmata of Cushings Syndrome] : no stigmata of Cushings Syndrome [Oriented x3] : oriented to person, place, and time [Normal Affect] : the affect was normal [Normal Mood] : the mood was normal [de-identified] : +Heterogenous gland [de-identified] : +LE edema left > right mostly in the ankle

## 2021-08-17 NOTE — REASON FOR VISIT
[Follow - Up] : a follow-up visit [Hypothyroidism] : hypothyroidism [Family Member] : family member [Thyroid nodule/ MNG] : thyroid nodule/ MNG

## 2022-02-03 ENCOUNTER — EMERGENCY (EMERGENCY)
Facility: HOSPITAL | Age: 87
LOS: 1 days | Discharge: HOME | End: 2022-02-03
Attending: EMERGENCY MEDICINE | Admitting: EMERGENCY MEDICINE
Payer: MEDICARE

## 2022-02-03 VITALS
RESPIRATION RATE: 18 BRPM | SYSTOLIC BLOOD PRESSURE: 138 MMHG | DIASTOLIC BLOOD PRESSURE: 54 MMHG | HEIGHT: 60 IN | OXYGEN SATURATION: 95 % | HEART RATE: 76 BPM | TEMPERATURE: 98 F

## 2022-02-03 DIAGNOSIS — Z95.0 PRESENCE OF CARDIAC PACEMAKER: Chronic | ICD-10-CM

## 2022-02-03 DIAGNOSIS — Z98.62 PERIPHERAL VASCULAR ANGIOPLASTY STATUS: Chronic | ICD-10-CM

## 2022-02-03 DIAGNOSIS — Z90.710 ACQUIRED ABSENCE OF BOTH CERVIX AND UTERUS: Chronic | ICD-10-CM

## 2022-02-03 DIAGNOSIS — Z90.89 ACQUIRED ABSENCE OF OTHER ORGANS: Chronic | ICD-10-CM

## 2022-02-03 LAB
ALBUMIN SERPL ELPH-MCNC: 3.8 G/DL — SIGNIFICANT CHANGE UP (ref 3.3–5)
ALP SERPL-CCNC: 76 U/L — SIGNIFICANT CHANGE UP (ref 40–120)
ALT FLD-CCNC: 21 U/L — SIGNIFICANT CHANGE UP (ref 4–33)
ANION GAP SERPL CALC-SCNC: 13 MMOL/L — SIGNIFICANT CHANGE UP (ref 7–14)
AST SERPL-CCNC: 26 U/L — SIGNIFICANT CHANGE UP (ref 4–32)
BASOPHILS # BLD AUTO: 0.04 K/UL — SIGNIFICANT CHANGE UP (ref 0–0.2)
BASOPHILS NFR BLD AUTO: 0.3 % — SIGNIFICANT CHANGE UP (ref 0–2)
BILIRUB SERPL-MCNC: 0.3 MG/DL — SIGNIFICANT CHANGE UP (ref 0.2–1.2)
BUN SERPL-MCNC: 50 MG/DL — HIGH (ref 7–23)
CALCIUM SERPL-MCNC: 9.6 MG/DL — SIGNIFICANT CHANGE UP (ref 8.4–10.5)
CHLORIDE SERPL-SCNC: 101 MMOL/L — SIGNIFICANT CHANGE UP (ref 98–107)
CK SERPL-CCNC: 286 U/L — HIGH (ref 25–170)
CO2 SERPL-SCNC: 23 MMOL/L — SIGNIFICANT CHANGE UP (ref 22–31)
CREAT SERPL-MCNC: 1.01 MG/DL — SIGNIFICANT CHANGE UP (ref 0.5–1.3)
EOSINOPHIL # BLD AUTO: 0 K/UL — SIGNIFICANT CHANGE UP (ref 0–0.5)
EOSINOPHIL NFR BLD AUTO: 0 % — SIGNIFICANT CHANGE UP (ref 0–6)
GLUCOSE SERPL-MCNC: 143 MG/DL — HIGH (ref 70–99)
HCT VFR BLD CALC: 34.3 % — LOW (ref 34.5–45)
HGB BLD-MCNC: 11.1 G/DL — LOW (ref 11.5–15.5)
IANC: 11.34 K/UL — HIGH (ref 1.5–8.5)
IMM GRANULOCYTES NFR BLD AUTO: 0.5 % — SIGNIFICANT CHANGE UP (ref 0–1.5)
LYMPHOCYTES # BLD AUTO: 0.72 K/UL — LOW (ref 1–3.3)
LYMPHOCYTES # BLD AUTO: 5.6 % — LOW (ref 13–44)
MCHC RBC-ENTMCNC: 30.8 PG — SIGNIFICANT CHANGE UP (ref 27–34)
MCHC RBC-ENTMCNC: 32.4 GM/DL — SIGNIFICANT CHANGE UP (ref 32–36)
MCV RBC AUTO: 95.3 FL — SIGNIFICANT CHANGE UP (ref 80–100)
MONOCYTES # BLD AUTO: 0.7 K/UL — SIGNIFICANT CHANGE UP (ref 0–0.9)
MONOCYTES NFR BLD AUTO: 5.4 % — SIGNIFICANT CHANGE UP (ref 2–14)
NEUTROPHILS # BLD AUTO: 11.34 K/UL — HIGH (ref 1.8–7.4)
NEUTROPHILS NFR BLD AUTO: 88.2 % — HIGH (ref 43–77)
NRBC # BLD: 0 /100 WBCS — SIGNIFICANT CHANGE UP
NRBC # FLD: 0 K/UL — SIGNIFICANT CHANGE UP
PLATELET # BLD AUTO: 202 K/UL — SIGNIFICANT CHANGE UP (ref 150–400)
POTASSIUM SERPL-MCNC: 4.3 MMOL/L — SIGNIFICANT CHANGE UP (ref 3.5–5.3)
POTASSIUM SERPL-SCNC: 4.3 MMOL/L — SIGNIFICANT CHANGE UP (ref 3.5–5.3)
PROT SERPL-MCNC: 6.3 G/DL — SIGNIFICANT CHANGE UP (ref 6–8.3)
RBC # BLD: 3.6 M/UL — LOW (ref 3.8–5.2)
RBC # FLD: 12.7 % — SIGNIFICANT CHANGE UP (ref 10.3–14.5)
SODIUM SERPL-SCNC: 137 MMOL/L — SIGNIFICANT CHANGE UP (ref 135–145)
WBC # BLD: 12.86 K/UL — HIGH (ref 3.8–10.5)
WBC # FLD AUTO: 12.86 K/UL — HIGH (ref 3.8–10.5)

## 2022-02-03 PROCEDURE — 99285 EMERGENCY DEPT VISIT HI MDM: CPT

## 2022-02-03 PROCEDURE — 73502 X-RAY EXAM HIP UNI 2-3 VIEWS: CPT | Mod: 26,LT

## 2022-02-03 PROCEDURE — 73060 X-RAY EXAM OF HUMERUS: CPT | Mod: 26,LT

## 2022-02-03 PROCEDURE — 73080 X-RAY EXAM OF ELBOW: CPT | Mod: 26,LT

## 2022-02-03 RX ORDER — ACETAMINOPHEN 500 MG
650 TABLET ORAL ONCE
Refills: 0 | Status: COMPLETED | OUTPATIENT
Start: 2022-02-03 | End: 2022-02-03

## 2022-02-03 RX ORDER — SODIUM CHLORIDE 9 MG/ML
1000 INJECTION INTRAMUSCULAR; INTRAVENOUS; SUBCUTANEOUS ONCE
Refills: 0 | Status: COMPLETED | OUTPATIENT
Start: 2022-02-03 | End: 2022-02-03

## 2022-02-03 RX ADMIN — SODIUM CHLORIDE 1000 MILLILITER(S): 9 INJECTION INTRAMUSCULAR; INTRAVENOUS; SUBCUTANEOUS at 23:12

## 2022-02-03 RX ADMIN — Medication 650 MILLIGRAM(S): at 22:59

## 2022-02-03 NOTE — ED ADULT TRIAGE NOTE - CHIEF COMPLAINT QUOTE
Pt arrives s/p unwitnessed fall at approx 2pm, as per EMS patient on floor for 4-6 hours before being found. Unsure how she fell. States hit the left side of her head, L shoulder and L leg. A&Ox4 at present. Per EMS, pt. ambulatory with assistance on scene. Denies LOC, dizziness, HA. PMHx: HTN, HLD, hypothyroidism, pacemaker, on Plavix

## 2022-02-03 NOTE — ED PROVIDER NOTE - NSICDXPASTMEDICALHX_GEN_ALL_CORE_FT
PAST MEDICAL HISTORY:  Arrhythmia pacemaker - [facility] -2016    Cataract     CKD (chronic kidney disease)     Gastroesophageal reflux disease without esophagitis     Hypertension     Hypothyroid last TFT's 10/5/17 with Endocrine    Macular degeneration     Peripheral vascular disease peripheral stent , on plavix and aspirin and to continue until OR

## 2022-02-03 NOTE — ED PROVIDER NOTE - PROGRESS NOTE DETAILS
Philippe PGY1: pt daughter at bedside is in home health care. Says she will get home health care for mother for falls. Understands risks of mother living alone multiple falls and on AC. Philippe PGY1: pt was able to ambulate with assistance. Normally ambulates with walker. O k to jeanc

## 2022-02-03 NOTE — ED PROVIDER NOTE - PHYSICAL EXAMINATION
Gen: NAD, non-toxic appearing  Head: normal appearing no signs basilar skull fx  HEENT: normal conjunctiva, oral mucosa moist  Lung: no respiratory distress, speaking in full sentences, CTA b/l     CV: regular rate and rhythm, no murmurs  Abd: soft, non distended, non tender   MSK: no visible deformities tender ecchymosis L humerus/ elbow, TT L chest, L leg t spine TTP full rom neck   Neuro: CN2-12 grossly intact, A&Ox4, MS +5/5 in UE and LE BL, gross sensation intact in UE and LE BL, g  Skin: Warm  Psych: normal affect

## 2022-02-03 NOTE — ED ADULT NURSE NOTE - OBJECTIVE STATEMENT
pt received to room 13, A&Ox4 ambulatory with walker c/o pain s/p unwitnessed fall. pt is very hard of hearing, lives alone with daughter nearby. states she was reaching for something and fell, onto left side of body and hitting left side of head. denies LOC, on plavix. hematoma noted to left elbow. 22G IV placed right AC, labs sent. pt repositioned in bed, does not wish to get undressed at this time. daughter Geovanna at bedside. comfort and safety measures provided.

## 2022-02-03 NOTE — ED ADULT NURSE NOTE - NSICDXPASTSURGICALHX_GEN_ALL_CORE_FT
PAST SURGICAL HISTORY:  Artificial pacemaker 2016 [facility], [facility]    H/O abdominal hysterectomy     H/O angioplasty PERIPHERAL WITH 1 STENT    S/P tonsillectomy as child

## 2022-02-03 NOTE — ED ADULT NURSE NOTE - INTERVENTIONS DEFINITIONS
[facility] to call system/Call bell, personal items and telephone within reach/Instruct patient to call for assistance/Non-slip footwear when patient is off stretcher/Physically safe environment: no spills, clutter or unnecessary equipment/Monitor gait and stability/Monitor for mental status changes and reorient to person, place, and time

## 2022-02-03 NOTE — ED PROVIDER NOTE - NSICDXFAMILYHX_GEN_ALL_CORE_FT
FAMILY HISTORY:  Mother  Still living? No  Family history of early CAD, Age at diagnosis: Age Unknown    Sibling  Still living? No  Family history of early CAD, Age at diagnosis: Age Unknown

## 2022-02-03 NOTE — ED PROVIDER NOTE - NSICDXPASTSURGICALHX_GEN_ALL_CORE_FT
PAST SURGICAL HISTORY:  Artificial pacemaker 2016 [facility], [facility]    H/O abdominal hysterectomy     H/O angioplasty PERIPHERAL WITH 1 STENT    S/P tonsillectomy as child

## 2022-02-03 NOTE — ED PROVIDER NOTE - CLINICAL SUMMARY MEDICAL DECISION MAKING FREE TEXT BOX
89 yo F on AC s/p fall hit head no LOC able to ambulate. VSS. no signs of skull fx. ecchymosis L humerus/ elbow, TT L chest, L leg t spine TTP full rom neck. Concern for head bleed spinal fx rib fx pelvic fx arm/ leg fx vs contusion. Will get labs coags CT head cspine chest abd pelvis x ray arm and hip. Will give analgesia and reassess.

## 2022-02-03 NOTE — ED PROVIDER NOTE - ATTENDING CONTRIBUTION TO CARE
89F CKD DM.  [facility]  No prodromal sx.  Hit head and L side of body.  Pt on plavix.  No LOC.  Hit head on carpeted wood floor.  On floor x 6 hours.  Remembers entire event.  Pt fell a month ago and broke 4 ribs.  No HA, no N/V or dizziness or weakness.  Nontender C-spine.  T-spine ttp.  No abd ttp. Ant ribs ttp.  L hip ttp.  Ecchymosis to L medial biceps area.  Plan pan scan.  Discussed with DA at bedside.  Pt lives alone.  Discussed frequent falls, investigating mirna for uneven areas, shoes not too grippy.  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  If workup nonactionable OK to d/c home f/u PMD.  VS:  unremarkable    GEN - NAD;   well appearing;   A+O x3   HEAD - NC/AT     ENT - PEERL, EOMI, mucous membranes    moist , no discharge      NECK: Neck supple, non-tender without lymphadenopathy, no masses, no JVD  PULM - CTA b/l,  symmetric breath sounds.  Anterior ribs ttp, no deformity or skin change.   COR -  normal heart sounds    ABD - , ND, NT, soft,  BACK - no CVA tenderness, nontender spine   except lower T-spine ttp w/o deformity or skin change.   EXTREMS - no edema, no deformity, warm and well perfused  L hip mild ttp no deformity and full ROM L hip.  Distal foot NVT intact.   SKIN - no rash    or bruising   except L medial biceps area ecchymosis without bony deformity.    NEUROLOGIC - alert, face symmetric, speech fluent, sensation nl, motor no focal deficit.

## 2022-02-03 NOTE — ED PROVIDER NOTE - NSFOLLOWUPINSTRUCTIONS_ED_ALL_ED_FT
Contusion    A contusion is a deep bruise. Contusions are the result of a blunt injury to tissues and muscle fibers under the skin. The skin overlying the contusion may turn blue, purple, or yellow. Symptoms also include pain and swelling in the injured area.    SEEK IMMEDIATE MEDICAL CARE IF YOU HAVE ANY OF THE FOLLOWING SYMPTOMS: severe pain, numbness, tingling, pain, weakness, or skin color/temperature change in any part of your body distal to the injury.    -Please take Tylenol up to 650 mg every 6 hours as needed for pain and/or Motrin up to 600 mg every 8 hours as needed for pain    - Be sure to return to the ED if you develop new or worsening symptoms. Specific signs and symptoms to be vigilant of: fever or chills, chest pain, difficulty breathing, palpitations, loss of consciousness, headache, vision changes, slurred speech, difficulty swallowing or drooling, facial droop, weakness in the arms or legs, numbness or tingling, abdominal pain, nausea or vomiting, diarrhea, constipation, blood in the stool or urine, pain on urination, difficulty urinating.

## 2022-02-03 NOTE — ED PROVIDER NOTE - OBJECTIVE STATEMENT
89 F pmhx HTN, HLD, DM, PVD, CKD, hypothyroidism, s/p PPM on plavix and asa presents s/p fall. 89 F pmhx HTN, HLD, DM, PVD, CKD, hypothyroidism, s/p PPM on plavix and asa presents s/p fall. pt slipped had mechanical fall around 2pm. No chest pain sob or other pain before fall. Fell onto L side hit head no LOC recalls entire event. Was down 4 hours. Once picked up able to ambulate. 89 F pmhx HTN, HLD, DM, PVD, CKD, hypothyroidism, s/p PPM on plavix and asa presents s/p fall. pt slipped had mechanical fall around 2pm. No chest pain sob or other pain before fall. Fell onto L side hit head no LOC recalls entire event. Was down 4 hours. Once picked up able to ambulate. Reports pain to L side arm leg. Denies ha changes in vision n/v dizziness. denies weakness loss of sensation. Pt had fall 1 month ago and broke 4 ribs.

## 2022-02-03 NOTE — ED PROVIDER NOTE - CARE PLAN
1 Principal Discharge DX:	Fall   Principal Discharge DX:	Back pain due to injury  Secondary Diagnosis:	Fall  Secondary Diagnosis:	Traumatic hematoma of left upper arm

## 2022-02-04 PROCEDURE — 71260 CT THORAX DX C+: CPT | Mod: 26,MA

## 2022-02-04 PROCEDURE — 74177 CT ABD & PELVIS W/CONTRAST: CPT | Mod: 26,MA

## 2022-02-04 PROCEDURE — 72125 CT NECK SPINE W/O DYE: CPT | Mod: 26,MA

## 2022-02-04 PROCEDURE — 70450 CT HEAD/BRAIN W/O DYE: CPT | Mod: 26,MA

## 2022-02-22 ENCOUNTER — TRANSCRIPTION ENCOUNTER (OUTPATIENT)
Age: 87
End: 2022-02-22

## 2022-03-07 ENCOUNTER — INPATIENT (INPATIENT)
Facility: HOSPITAL | Age: 87
LOS: 3 days | Discharge: NURSING, REHAB OR LONG-TERM CARE | End: 2022-03-11
Attending: HOSPITALIST | Admitting: HOSPITALIST
Payer: MEDICARE

## 2022-03-07 VITALS
HEIGHT: 60 IN | HEART RATE: 89 BPM | SYSTOLIC BLOOD PRESSURE: 141 MMHG | OXYGEN SATURATION: 98 % | RESPIRATION RATE: 18 BRPM | DIASTOLIC BLOOD PRESSURE: 68 MMHG | TEMPERATURE: 99 F

## 2022-03-07 DIAGNOSIS — Z90.710 ACQUIRED ABSENCE OF BOTH CERVIX AND UTERUS: Chronic | ICD-10-CM

## 2022-03-07 DIAGNOSIS — E86.0 DEHYDRATION: ICD-10-CM

## 2022-03-07 DIAGNOSIS — Z29.9 ENCOUNTER FOR PROPHYLACTIC MEASURES, UNSPECIFIED: ICD-10-CM

## 2022-03-07 DIAGNOSIS — I73.9 PERIPHERAL VASCULAR DISEASE, UNSPECIFIED: ICD-10-CM

## 2022-03-07 DIAGNOSIS — Z90.89 ACQUIRED ABSENCE OF OTHER ORGANS: Chronic | ICD-10-CM

## 2022-03-07 DIAGNOSIS — Z95.0 PRESENCE OF CARDIAC PACEMAKER: Chronic | ICD-10-CM

## 2022-03-07 DIAGNOSIS — I10 ESSENTIAL (PRIMARY) HYPERTENSION: ICD-10-CM

## 2022-03-07 DIAGNOSIS — E78.5 HYPERLIPIDEMIA, UNSPECIFIED: ICD-10-CM

## 2022-03-07 DIAGNOSIS — Z98.62 PERIPHERAL VASCULAR ANGIOPLASTY STATUS: Chronic | ICD-10-CM

## 2022-03-07 DIAGNOSIS — R26.2 DIFFICULTY IN WALKING, NOT ELSEWHERE CLASSIFIED: ICD-10-CM

## 2022-03-07 DIAGNOSIS — E03.9 HYPOTHYROIDISM, UNSPECIFIED: ICD-10-CM

## 2022-03-07 LAB
ALBUMIN SERPL ELPH-MCNC: 3.7 G/DL — SIGNIFICANT CHANGE UP (ref 3.3–5)
ALP SERPL-CCNC: 174 U/L — HIGH (ref 40–120)
ALT FLD-CCNC: 18 U/L — SIGNIFICANT CHANGE UP (ref 4–33)
ANION GAP SERPL CALC-SCNC: 12 MMOL/L — SIGNIFICANT CHANGE UP (ref 7–14)
AST SERPL-CCNC: 21 U/L — SIGNIFICANT CHANGE UP (ref 4–32)
BASOPHILS # BLD AUTO: 0.03 K/UL — SIGNIFICANT CHANGE UP (ref 0–0.2)
BASOPHILS NFR BLD AUTO: 0.3 % — SIGNIFICANT CHANGE UP (ref 0–2)
BILIRUB SERPL-MCNC: 0.4 MG/DL — SIGNIFICANT CHANGE UP (ref 0.2–1.2)
BUN SERPL-MCNC: 62 MG/DL — HIGH (ref 7–23)
CALCIUM SERPL-MCNC: 8.9 MG/DL — SIGNIFICANT CHANGE UP (ref 8.4–10.5)
CHLORIDE SERPL-SCNC: 102 MMOL/L — SIGNIFICANT CHANGE UP (ref 98–107)
CO2 SERPL-SCNC: 24 MMOL/L — SIGNIFICANT CHANGE UP (ref 22–31)
CREAT SERPL-MCNC: 1.3 MG/DL — SIGNIFICANT CHANGE UP (ref 0.5–1.3)
EGFR: 38 ML/MIN/1.73M2 — LOW
EOSINOPHIL # BLD AUTO: 0.04 K/UL — SIGNIFICANT CHANGE UP (ref 0–0.5)
EOSINOPHIL NFR BLD AUTO: 0.4 % — SIGNIFICANT CHANGE UP (ref 0–6)
GLUCOSE SERPL-MCNC: 122 MG/DL — HIGH (ref 70–99)
HCT VFR BLD CALC: 35.4 % — SIGNIFICANT CHANGE UP (ref 34.5–45)
HGB BLD-MCNC: 11.7 G/DL — SIGNIFICANT CHANGE UP (ref 11.5–15.5)
IANC: 7.78 K/UL — SIGNIFICANT CHANGE UP (ref 1.5–8.5)
IMM GRANULOCYTES NFR BLD AUTO: 0.5 % — SIGNIFICANT CHANGE UP (ref 0–1.5)
LYMPHOCYTES # BLD AUTO: 1.21 K/UL — SIGNIFICANT CHANGE UP (ref 1–3.3)
LYMPHOCYTES # BLD AUTO: 12 % — LOW (ref 13–44)
MCHC RBC-ENTMCNC: 30.8 PG — SIGNIFICANT CHANGE UP (ref 27–34)
MCHC RBC-ENTMCNC: 33.1 GM/DL — SIGNIFICANT CHANGE UP (ref 32–36)
MCV RBC AUTO: 93.2 FL — SIGNIFICANT CHANGE UP (ref 80–100)
MONOCYTES # BLD AUTO: 0.95 K/UL — HIGH (ref 0–0.9)
MONOCYTES NFR BLD AUTO: 9.4 % — SIGNIFICANT CHANGE UP (ref 2–14)
NEUTROPHILS # BLD AUTO: 7.78 K/UL — HIGH (ref 1.8–7.4)
NEUTROPHILS NFR BLD AUTO: 77.4 % — HIGH (ref 43–77)
NRBC # BLD: 0 /100 WBCS — SIGNIFICANT CHANGE UP
NRBC # FLD: 0 K/UL — SIGNIFICANT CHANGE UP
PLATELET # BLD AUTO: 256 K/UL — SIGNIFICANT CHANGE UP (ref 150–400)
POTASSIUM SERPL-MCNC: 4.1 MMOL/L — SIGNIFICANT CHANGE UP (ref 3.5–5.3)
POTASSIUM SERPL-SCNC: 4.1 MMOL/L — SIGNIFICANT CHANGE UP (ref 3.5–5.3)
PROT SERPL-MCNC: 5.8 G/DL — LOW (ref 6–8.3)
RBC # BLD: 3.8 M/UL — SIGNIFICANT CHANGE UP (ref 3.8–5.2)
RBC # FLD: 12.8 % — SIGNIFICANT CHANGE UP (ref 10.3–14.5)
SARS-COV-2 RNA SPEC QL NAA+PROBE: SIGNIFICANT CHANGE UP
SODIUM SERPL-SCNC: 138 MMOL/L — SIGNIFICANT CHANGE UP (ref 135–145)
TSH SERPL-MCNC: 1.09 UIU/ML — SIGNIFICANT CHANGE UP (ref 0.27–4.2)
WBC # BLD: 10.06 K/UL — SIGNIFICANT CHANGE UP (ref 3.8–10.5)
WBC # FLD AUTO: 10.06 K/UL — SIGNIFICANT CHANGE UP (ref 3.8–10.5)

## 2022-03-07 PROCEDURE — 99285 EMERGENCY DEPT VISIT HI MDM: CPT | Mod: GC

## 2022-03-07 PROCEDURE — 73700 CT LOWER EXTREMITY W/O DYE: CPT | Mod: 26,RT

## 2022-03-07 PROCEDURE — 73502 X-RAY EXAM HIP UNI 2-3 VIEWS: CPT | Mod: 26,RT

## 2022-03-07 PROCEDURE — 99223 1ST HOSP IP/OBS HIGH 75: CPT

## 2022-03-07 RX ORDER — SIMVASTATIN 20 MG/1
1 TABLET, FILM COATED ORAL
Qty: 0 | Refills: 0 | DISCHARGE

## 2022-03-07 RX ORDER — SENNA PLUS 8.6 MG/1
2 TABLET ORAL AT BEDTIME
Refills: 0 | Status: DISCONTINUED | OUTPATIENT
Start: 2022-03-07 | End: 2022-03-11

## 2022-03-07 RX ORDER — ASPIRIN/CALCIUM CARB/MAGNESIUM 324 MG
1 TABLET ORAL
Qty: 0 | Refills: 0 | DISCHARGE

## 2022-03-07 RX ORDER — HYDRALAZINE HCL 50 MG
50 TABLET ORAL DAILY
Refills: 0 | Status: DISCONTINUED | OUTPATIENT
Start: 2022-03-08 | End: 2022-03-09

## 2022-03-07 RX ORDER — AMLODIPINE BESYLATE 2.5 MG/1
1 TABLET ORAL
Qty: 0 | Refills: 0 | DISCHARGE

## 2022-03-07 RX ORDER — IBUPROFEN 200 MG
400 TABLET ORAL ONCE
Refills: 0 | Status: COMPLETED | OUTPATIENT
Start: 2022-03-07 | End: 2022-03-07

## 2022-03-07 RX ORDER — LIDOCAINE 4 G/100G
1 CREAM TOPICAL ONCE
Refills: 0 | Status: COMPLETED | OUTPATIENT
Start: 2022-03-07 | End: 2022-03-07

## 2022-03-07 RX ORDER — LEVOTHYROXINE SODIUM 125 MCG
88 TABLET ORAL DAILY
Refills: 0 | Status: DISCONTINUED | OUTPATIENT
Start: 2022-03-08 | End: 2022-03-11

## 2022-03-07 RX ORDER — ACETAMINOPHEN 500 MG
975 TABLET ORAL ONCE
Refills: 0 | Status: COMPLETED | OUTPATIENT
Start: 2022-03-07 | End: 2022-03-07

## 2022-03-07 RX ORDER — MULTIVIT-MIN/FERROUS GLUCONATE 9 MG/15 ML
1 LIQUID (ML) ORAL
Qty: 0 | Refills: 0 | DISCHARGE

## 2022-03-07 RX ORDER — LEVOTHYROXINE SODIUM 125 MCG
1 TABLET ORAL
Qty: 0 | Refills: 0 | DISCHARGE

## 2022-03-07 RX ORDER — CLOPIDOGREL BISULFATE 75 MG/1
75 TABLET, FILM COATED ORAL DAILY
Refills: 0 | Status: DISCONTINUED | OUTPATIENT
Start: 2022-03-08 | End: 2022-03-11

## 2022-03-07 RX ORDER — ASPIRIN/CALCIUM CARB/MAGNESIUM 324 MG
81 TABLET ORAL DAILY
Refills: 0 | Status: DISCONTINUED | OUTPATIENT
Start: 2022-03-08 | End: 2022-03-11

## 2022-03-07 RX ORDER — LOSARTAN POTASSIUM 100 MG/1
100 TABLET, FILM COATED ORAL DAILY
Refills: 0 | Status: DISCONTINUED | OUTPATIENT
Start: 2022-03-08 | End: 2022-03-11

## 2022-03-07 RX ORDER — VALSARTAN 80 MG/1
1 TABLET ORAL
Qty: 0 | Refills: 0 | DISCHARGE

## 2022-03-07 RX ORDER — SPIRONOLACTONE 25 MG/1
25 TABLET, FILM COATED ORAL DAILY
Refills: 0 | Status: DISCONTINUED | OUTPATIENT
Start: 2022-03-08 | End: 2022-03-11

## 2022-03-07 RX ORDER — HEPARIN SODIUM 5000 [USP'U]/ML
5000 INJECTION INTRAVENOUS; SUBCUTANEOUS EVERY 12 HOURS
Refills: 0 | Status: DISCONTINUED | OUTPATIENT
Start: 2022-03-07 | End: 2022-03-11

## 2022-03-07 RX ORDER — CHOLECALCIFEROL (VITAMIN D3) 125 MCG
1000 CAPSULE ORAL DAILY
Refills: 0 | Status: DISCONTINUED | OUTPATIENT
Start: 2022-03-08 | End: 2022-03-08

## 2022-03-07 RX ORDER — IRBESARTAN 75 MG/1
1 TABLET ORAL
Qty: 0 | Refills: 0 | DISCHARGE

## 2022-03-07 RX ORDER — UBIDECARENONE 100 MG
100 CAPSULE ORAL
Qty: 0 | Refills: 0 | DISCHARGE

## 2022-03-07 RX ORDER — SIMVASTATIN 20 MG/1
20 TABLET, FILM COATED ORAL AT BEDTIME
Refills: 0 | Status: DISCONTINUED | OUTPATIENT
Start: 2022-03-08 | End: 2022-03-11

## 2022-03-07 RX ORDER — POLYETHYLENE GLYCOL 3350 17 G/17G
17 POWDER, FOR SOLUTION ORAL DAILY
Refills: 0 | Status: DISCONTINUED | OUTPATIENT
Start: 2022-03-07 | End: 2022-03-11

## 2022-03-07 RX ORDER — ESCITALOPRAM OXALATE 10 MG/1
10 TABLET, FILM COATED ORAL DAILY
Refills: 0 | Status: DISCONTINUED | OUTPATIENT
Start: 2022-03-08 | End: 2022-03-11

## 2022-03-07 RX ORDER — CLOPIDOGREL BISULFATE 75 MG/1
1 TABLET, FILM COATED ORAL
Qty: 0 | Refills: 0 | DISCHARGE

## 2022-03-07 RX ORDER — HYDRALAZINE HCL 50 MG
0 TABLET ORAL
Qty: 0 | Refills: 0 | DISCHARGE

## 2022-03-07 RX ORDER — OMEGA-3 ACID ETHYL ESTERS 1 G
1 CAPSULE ORAL DAILY
Refills: 0 | Status: DISCONTINUED | OUTPATIENT
Start: 2022-03-08 | End: 2022-03-11

## 2022-03-07 RX ORDER — RANITIDINE HYDROCHLORIDE 150 MG/1
0 TABLET, FILM COATED ORAL
Qty: 0 | Refills: 0 | DISCHARGE

## 2022-03-07 RX ORDER — ACETAMINOPHEN 500 MG
1 TABLET ORAL
Qty: 0 | Refills: 0 | DISCHARGE

## 2022-03-07 RX ORDER — ACETAMINOPHEN 500 MG
650 TABLET ORAL EVERY 6 HOURS
Refills: 0 | Status: DISCONTINUED | OUTPATIENT
Start: 2022-03-07 | End: 2022-03-11

## 2022-03-07 RX ORDER — ESCITALOPRAM OXALATE 10 MG/1
1 TABLET, FILM COATED ORAL
Qty: 0 | Refills: 0 | DISCHARGE

## 2022-03-07 RX ORDER — AMLODIPINE BESYLATE 2.5 MG/1
10 TABLET ORAL DAILY
Refills: 0 | Status: DISCONTINUED | OUTPATIENT
Start: 2022-03-08 | End: 2022-03-09

## 2022-03-07 RX ORDER — OMEPRAZOLE 10 MG/1
1 CAPSULE, DELAYED RELEASE ORAL
Qty: 0 | Refills: 0 | DISCHARGE

## 2022-03-07 RX ADMIN — Medication 975 MILLIGRAM(S): at 18:18

## 2022-03-07 RX ADMIN — Medication 400 MILLIGRAM(S): at 18:18

## 2022-03-07 RX ADMIN — LIDOCAINE 1 PATCH: 4 CREAM TOPICAL at 18:18

## 2022-03-07 NOTE — ED PROVIDER NOTE - PROGRESS NOTE DETAILS
Ling Murray M.D. [employer]  daughter and pt agreeable to admission for pt eval and short term rehab placement.

## 2022-03-07 NOTE — ED PROVIDER NOTE - NSICDXPASTSURGICALHX_GEN_ALL_CORE_FT
PAST SURGICAL HISTORY:  Artificial pacemaker 2016 [facility], [facility]    H/O abdominal hysterectomy     H/O angioplasty PERIPHERAL WITH 1 STENT    S/P tonsillectomy as child

## 2022-03-07 NOTE — H&P ADULT - PROBLEM SELECTOR PLAN 1
-Patient presenting with progressive R. inner thigh and hip pain 2/2 fall on 2/3 which has led to declining ability to preform ADLs   -Preliminary X-ray of pelvis and R. hip in ED devoid of fractures or dislocation. Follow up final read.  -Tylenol 650 mg q 6 hours prn for pain control  -Lidocaine patches QD to R. inner thigh  -PT/OT consults   -Ambulate with assistance   -Will likely need discharge to rehab facility in future  -Fall precautions ordered -Patient presenting with progressive R. inner thigh and hip pain 2/2 fall on 2/3 which has led to declining ability to preform ADLs   -Preliminary X-ray of pelvis and R. hip in ED devoid of fractures or dislocation. Follow up final read.  -Tylenol 650 mg q 6 hours prn for pain control  -Lidocaine patches QD to R. inner thigh  -R. CT hip ordered  -Spine X-ray ordered to assess for fracture of thoracic spine; as per patient's daughter dx w/ fx of T12   -PT/OT consults   -Ambulate with assistance   -Will likely need discharge to rehab facility in future  -Fall precautions ordered (+) Rt hip area TTP  triggering guarding;  -Tylenol 650 mg q 6 hours prn for pain control  -Rt CT hip ordered  -Lidocaine patches QD to Rt inner thigh  -Ortho Sx c/s once CT hip performed

## 2022-03-07 NOTE — H&P ADULT - PROBLEM SELECTOR PLAN 9
-Patient has hx of bilateral femoral stent placements and R. carotid artery stent placement X2   -Vascular follow up with Dr. Mai scheduled for 6/2022   -Continue with home medications: Clopidogrel and ASA

## 2022-03-07 NOTE — ED PROVIDER NOTE - PHYSICAL EXAMINATION
GEN: Patient awake alert NAD.   HEENT: normocephalic, atraumatic, EOMI, no scleral icterus  CARDIAC: RRR, S1, S2, no murmur.   PULM: CTA B/L no wheeze, rhonchi, rales.   ABD: soft NT, ND, no rebound no guarding, no CVA tenderness.   MSK: +right hip/anterior pelvis, unable to ambulate.      NEURO: A&Ox2, no focal neurological deficits, CN 2-12 grossly intact  SKIN: warm, dry, no rash.

## 2022-03-07 NOTE — H&P ADULT - PROBLEM SELECTOR PLAN 4
-Continue with home medications   -DASH diet -TSH:1.09  -Continue with home medications Phys exam significant for sys 2/6 heart murmur;  -Screening EKG  -f/u with PCP regarding 2D echo heart  -ProBNP in AM Phys exam significant for sys 2/6 heart murmur;  -Screening EKG: V-paced, no acute changes   -f/u with PCP regarding 2D echo heart  -ProBNP in AM

## 2022-03-07 NOTE — H&P ADULT - PROBLEM SELECTOR PLAN 5
-Patient has hx of bilateral femoral stent placements and R. carotid artery stent placement X2   -Vascular follow up with Dr. Mai scheduled for 6/2022   -Continue with home medications: Clopidogrel and ASA -Continue with home medications   -DASH diet -Spine X-ray ordered to assess for fracture of thoracic spine; as per patient's daughter dx w/ fx of T12 Mild indeterminate age compression deformity of T12 as per CT CHEST, ABDOMEN AND PELVIS IC, 2/4/22          -pain control  -PT eval

## 2022-03-07 NOTE — H&P ADULT - MUSCULOSKELETAL
(+) Rt hip area TTP  triggering guarding/no joint swelling/no joint erythema/no joint warmth/no calf tenderness detailed exam

## 2022-03-07 NOTE — H&P ADULT - RS GEN PE MLT RESP DETAILS PC
breath sounds equal/good air movement/respirations non-labored breath sounds equal/good air movement/respirations non-labored/no rales/no rhonchi/no wheezes

## 2022-03-07 NOTE — H&P ADULT - NSICDXPASTSURGICALHX_GEN_ALL_CORE_FT
PAST SURGICAL HISTORY:  Artificial pacemaker 2016 [facility], [facility]    H/O angioplasty Bilateral femoral stents; R. carotid artery stents

## 2022-03-07 NOTE — ED ADULT NURSE REASSESSMENT NOTE - NS ED NURSE REASSESS COMMENT FT1
pt received alert and oriented x3. pt denies pain at the moment. iv intact. pt nad. Call bell in reach, warm blanket provided, bed in lowest position, side rails up x2,safety maintained. will continue to monitor.

## 2022-03-07 NOTE — H&P ADULT - ASSESSMENT
89 y/o domiciled female w/ MH of hypothyroidism, PVD, HTN, HLD, and history of bilateral femoral stent placements and well as R. carotid stent placement X 2 presents to ED with complaint of inability to ambulate. Being admitted to medicine for further evaluation and management.  89 y/o domiciled female w/ MH of hypothyroidism, PVD, HTN, HLD, and history of bilateral femoral stent placements and well as R. carotid stent placement X 2 a/w Rt hip pain with inability to ambulate. Found also to be dehydrated and to have a sys heart murmur;

## 2022-03-07 NOTE — H&P ADULT - NSHPREVIEWOFSYSTEMS_GEN_ALL_CORE
CONSTITUTIONAL: No fever; No chills; No night sweats; No weight loss; No fatigue  EYES: No eye pain; No blurry vision  ENMT:  No difficulty hearing; No sinus or throat pain  NECK: No pain or stiffness  RESPIRATORY: No cough; No wheezing; No hemoptysis; No shortness of breath; No sputum production  CARDIOVASCULAR: No chest pain; No palpitations; No leg swelling  GASTROINTESTINAL: No abdominal pain; No nausea; No vomiting; No hematemesis; No diarrhea; No constipation. No melena or hematochezia.  GENITOURINARY: No dysuria; No frequency; No hematuria; No incontinence  NEUROLOGICAL: No headaches; No loss of strength; No numbness  SKIN: No itching/burning; No rashes or lesions   MUSCULOSKELETAL: +R. thigh pain, No calf pain, No muscle, back, or extremity pain  HEME/LYMPH: No easy bruising, or bleeding gums CONSTITUTIONAL: No fever; No chills; No night sweats; No weight loss; No fatigue  EYES: No eye pain; No blurry vision  ENMT:  No difficulty hearing; No sinus or throat pain  NECK: No pain or stiffness  RESPIRATORY: No cough; No wheezing; No hemoptysis; No shortness of breath; No sputum production  CARDIOVASCULAR: No chest pain; No palpitations; No leg swelling  GASTROINTESTINAL: No abdominal pain; No nausea; No vomiting; No hematemesis; No diarrhea; No constipation. No melena or hematochezia.  GENITOURINARY: No dysuria; No frequency; No hematuria; No incontinence  NEUROLOGICAL: No headaches; No loss of strength; No numbness  SKIN: No itching/burning; No rashes or lesions   MUSCULOSKELETAL: +R. thigh pain + Rt hip pain, No calf pain, No muscle, back, or extremity pain  HEME/LYMPH: No easy bruising, or bleeding gums

## 2022-03-07 NOTE — H&P ADULT - NSHPSOCIALHISTORY_GEN_ALL_CORE
Retired  residing in an apartment [facility]; ambulates at baseline with walker. No present or past use of alcohol, tobacco or tobacco products or illicit drugs. Retired  residing [place] [facility] [place]; ambulates at baseline with walker.     No present or past use of alcohol, tobacco or tobacco products or illicit drugs.

## 2022-03-07 NOTE — H&P ADULT - NSICDXPASTMEDICALHX_GEN_ALL_CORE_FT
PAST MEDICAL HISTORY:  Arrhythmia pacemaker - [facility] -2016    Hyperlipidemia     Hypertension     Hypothyroid last TFT's 10/5/17 with Endocrine    Peripheral vascular disease peripheral stent , on plavix and aspirin and to continue until OR    Prophylactic measure

## 2022-03-07 NOTE — H&P ADULT - PROBLEM SELECTOR PLAN 8
-DVT ppx: Heparin 5000 U SQ q 12 hours   -Ambulate with assistance  -Elevate head of bed  -Aspiration precautions ordered   -Fall precautions ordered   -DASH diet: minced and moist once passes dysphagia screen -Continue with home medications   -DASH diet

## 2022-03-07 NOTE — H&P ADULT - ATTENDING COMMENTS
89 y/o domiciled female w/ MH of hypothyroidism, PVD, HTN, HLD, and history of bilateral femoral stent placements and well as R. carotid stent placement X 2 a/w Rt hip pain with inability to ambulate. Found also to be dehydrated and to have a sys heart murmur;     Assessment and plan supplemented and modified by attending where indicated;

## 2022-03-07 NOTE — ED PROVIDER NOTE - CLINICAL SUMMARY MEDICAL DECISION MAKING FREE TEXT BOX
89 yo F hx of CKD, hypothyroid, PVD, carotid and b/l femoral stents, on clopidogrel, HTN, recent dx T12 compression fracture, +bears weight on right leg, but unable to walk due to pain.

## 2022-03-07 NOTE — H&P ADULT - PROBLEM SELECTOR PLAN 2
-TSH:1.09  -Continue with home medications -BUN/Cr ratio 47  -Will hold hydrochlorothiazide and Furosemide for the next 24 hours   -Monitor hydration status -Patient presenting with progressive R. inner thigh and hip pain 2/2 fall on 2/3 which has led to declining ability to preform ADLs   -Preliminary X-ray of pelvis and R. hip in ED devoid of fractures or dislocation. Follow up final read.  -Spine X-ray ordered to assess for fracture of thoracic spine; as per patient's daughter dx w/ fx of T12   -PT/OT consults   -Ambulate with assistance   -Will likely need discharge to rehab facility in future  -Fall precautions ordered -Patient presenting with progressive R. inner thigh and hip pain 2/2 fall on 2/3 which has led to declining ability to preform ADLs   -Preliminary X-ray of pelvis and R. hip in ED devoid of fractures or dislocation. Follow up final read.  -PT/OT consults   -Ambulate with assistance   -Will likely need discharge to rehab facility   -Fall precautions ordered

## 2022-03-07 NOTE — H&P ADULT - PROBLEM SELECTOR PLAN 3
-Continue with home medications   -DASH diet -TSH:1.09  -Continue with home medications -BUN/Cr ratio 47  -Will hold hydrochlorothiazide and Furosemide for the next 24 hours   -Monitor hydration status -BUN: SCr ratio 47; Dry oral MM   -Hold hydrochlorothiazide and Furosemide for  24-48hours   -Monitor hydration status  -change home Furosemide frequency on d/c from q24h to q48h (primary team)

## 2022-03-07 NOTE — H&P ADULT - PROBLEM SELECTOR PLAN 6
-DVT ppx:  -Ambulate with assistance  -Fall precautions ordered   -DASH diet -Patient has hx of bilateral femoral stent placements and R. carotid artery stent placement X2   -Vascular follow up with Dr. Mai scheduled for 6/2022   -Continue with home medications: Clopidogrel and ASA -Continue with home medications   -DASH diet -TSH:1.09  -Continue with home medications

## 2022-03-07 NOTE — H&P ADULT - HISTORY OF PRESENT ILLNESS
89 y/o domiciled female w/ MH of hypothyroidism, PVD, HTN, HLD, and history of bilateral femoral stent placements and well as R. carotid stent placement X 2 presents to ED with complaint of inability to ambulate X 1 day. Admits to fall on 2/3 where she fell on carpeted mirna and subsequently after reported to [facility] ED. No fractures were identified at the time of evaluation and patient was sent home. As per daughter and patient pain did not mitigate with time so they saught a second opinion at urgent care where more imaging was done which was also negative for fractures. Patient was evaluated by Orthopedic surgeon,  who dx patient with compound fracture of T12. As per patient's daughter the physician gave her a steroid injection and placed her on a course of oral steroids (which caused relief of pain but resulted in slight confusion). At baseline patient lives in an apartment on her own and is able to care for ADLs. She ambulates with a walker at baseline and was able to walk with increasing difficulty following the fall. Patient has been unable to take a full shower and has relied on sponge baths post accident. Was enrolled in home health aide to be initiated  tomorrow preceding the pain. As per patient is isolated to the R. inner thigh and radiates to the right hip. Denies pain with movement of the leg in bed, applying weight on the leg results in shooting pain leading to inability to walk. Denies loss of sensation, back pain, bowel/bladder incontinence.  89 y/o domiciled female w/ MHx of hypothyroidism, PVD, HTN, HLD, and history of bilateral femoral stent placements and well as R. carotid stent placement X 2 presents to ED with complaint of inability to ambulate X 1 day. Reports a fall on 2/3/22 where she fell on carpeted mirna. No fractures were identified at the time of evaluation and patient was sent home. As per daughter and patient Rt leg pain pain did not mitigate with time so they sought a second opinion at urgent care where more imaging was done which was also negative for fractures. Patient was evaluated by Orthopedic surgeon, Dr. Guadalupe who dx patient with compound fracture of T12. As per patient's daughter the physician gave her a steroid injection and placed her on a course of oral steroids, which caused relief of pain but resulted in "slight" confusion. Patient lives in an apartment on her own and is able to perform ADLs. She ambulates with a walker at baseline and was able to walk with increasing difficulty following the fall. Patient has been unable to take a full shower and has relied on sponge baths post accident. Was enrolled in home health aide to be initiated  tomorrow preceding the pain. As per patient, the pain is isolated to the Rt  inner thigh and the right hip. Reports no pain with movement of the leg in bed, applying weight on the leg results in "shooting pain", 10/10, leading to inability to walk.  Reports no associated loss of sensation or bowel/bladder incontinence.

## 2022-03-07 NOTE — H&P ADULT - NSHPLABSRESULTS_GEN_ALL_CORE
11.7   10.06 )-----------( 256      ( 07 Mar 2022 16:38 )             35.4     03-07    138  |  102  |  62<H>  ----------------------------<  122<H>  4.1   |  24  |  1.30    Ca    8.9      07 Mar 2022 16:38    TPro  5.8<L>  /  Alb  3.7  /  TBili  0.4  /  DBili  x   /  AST  21  /  ALT  18  /  AlkPhos  174<H>  03-07        [facility] (03-07-22 @ 20:32):    CAPILLARY BLOOD GLUCOSE:      COVID-19/Full RVP Panel: Pending ---personally interpreted EKG:     ---personally reviewed the labs bellow:    11.7   10.06 )-----------( 256      ( 07 Mar 2022 16:38 )             35.4     03-07    138  |  102  |  62<H>  ----------------------------<  122<H>  4.1   |  24  |  1.30    Ca    8.9      07 Mar 2022 16:38    TPro  5.8<L>  /  Alb  3.7  /  TBili  0.4  /  DBili  x   /  AST  21  /  ALT  18  /  AlkPhos  174<H>  03-07    [facility] (03-07-22 @ 20:32):      COVID-19/Full RVP Panel: Pending    ---personally reviewed the radiologic imaging below:    03/07/2022  XR HIP 2-3V RT  XR PELVIS AP ONLY 1-2 VIEWS  INTERPRETATION: CLINICAL INDICATION: Right hip pain.  TECHNIQUE: Frontal view of the pelvis, 2 views of the right hip  COMPARISON: X-ray pelvis 2/22/2022  FINDINGS:  No acute displaced fracture or dislocation.  The sacroiliac joints and pubic symphysis remain intact.  Intact pelvic and obturator rings.  Lower extremity vascular stent and calcification.  IMPRESSION:  No acute displaced fracture or dislocation. ---personally interpreted EKG: V-paced rhythm, 68BPM, EYy=559, no acute changes     ---personally reviewed the labs bellow:    11.7   10.06 )-----------( 256      ( 07 Mar 2022 16:38 )             35.4     03-07    138  |  102  |  62<H>  ----------------------------<  122<H>  4.1   |  24  |  1.30    Ca    8.9      07 Mar 2022 16:38    TPro  5.8<L>  /  Alb  3.7  /  TBili  0.4  /  DBili  x   /  AST  21  /  ALT  18  /  AlkPhos  174<H>  03-07    [facility] (03-07-22 @ 20:32):      COVID-19/Full RVP Panel: Pending    ---personally reviewed the radiologic imaging below:    03/07/2022  XR HIP 2-3V RT  XR PELVIS AP ONLY 1-2 VIEWS  INTERPRETATION: CLINICAL INDICATION: Right hip pain.  TECHNIQUE: Frontal view of the pelvis, 2 views of the right hip  COMPARISON: X-ray pelvis 2/22/2022  FINDINGS:  No acute displaced fracture or dislocation.  The sacroiliac joints and pubic symphysis remain intact.  Intact pelvic and obturator rings.  Lower extremity vascular stent and calcification.  IMPRESSION:  No acute displaced fracture or dislocation. ---personally interpreted EKG: V-paced rhythm, 68BPM, RWa=922, no acute changes     ---personally reviewed the labs below:    11.7   10.06 )-----------( 256      ( 07 Mar 2022 16:38 )             35.4     03-07    138  |  102  |  62<H>  ----------------------------<  122<H>  4.1   |  24  |  1.30    Ca    8.9      07 Mar 2022 16:38    TPro  5.8<L>  /  Alb  3.7  /  TBili  0.4  /  DBili  x   /  AST  21  /  ALT  18  /  AlkPhos  174<H>  03-07    [facility] (03-07-22 @ 20:32):      COVID-19/Full RVP Panel: Pending    ---personally reviewed the radiologic imaging below:    03/07/2022  XR HIP 2-3V RT  XR PELVIS AP ONLY 1-2 VIEWS  INTERPRETATION: CLINICAL INDICATION: Right hip pain.  TECHNIQUE: Frontal view of the pelvis, 2 views of the right hip  COMPARISON: X-ray pelvis 2/22/2022  FINDINGS:  No acute displaced fracture or dislocation.  The sacroiliac joints and pubic symphysis remain intact.  Intact pelvic and obturator rings.  Lower extremity vascular stent and calcification.  IMPRESSION:  No acute displaced fracture or dislocation.

## 2022-03-07 NOTE — ED PROVIDER NOTE - IV ALTEPLASE EXCL REL HIDDEN
Nursing notes reviewed and accepted.    Malathi Neal is a 16 year old female who presents for well-child exam.  Patient presents with Mother.    Concerns raised today include:she has had acne and the past few weeks has been worse on her chest. She tried HC 1 % cream and has not been helping. Also she sometimes has pretibial and foot pain that occurs with exercise and improves with rest this past week. She is skating 11 hours a week and has started running for cross country.    Social History:   School: [employer] / 11th grade  Interests / Activities: skating, cross country  Future Plans: musician in [employer]  Tobacco:no  Alcohol: no  Illicit drugs or Homeopathic medicines: no  Sexually activity: no but has a boyfriend  Family History: Negative for athletic cardiac events      REVIEW OF SYSTEMS:  General: Feeling well, no fever/chills, no recent rapid weight changes  Dermatologic: No new or changes in existing moles or lesions. No rashes.  Neurologic: No dizziness, no headaches, numbness/tingling  Respiratory: No cough, wheeze, SOB  Nodes: No noted lymphadenopathy, swollen glands  Cardiac: No chest pain, palpitations, skipped beats. No prior history of murmur. No history of syncope.  Gastrointestinal: No emesis, diarrhea, constipation, or blood in stools  Genitourinary: No polyuria or dysuria, urgency, or frequency  Menstrual:  regularly every 28-30 days  Musculoskeletal: No joint swelling/warmth, no recent injuries  Hematologic: No easy bruising or bleeding  Psychiatric: No depressive symptoms. No sleeping issues.  No safety concerns.    PHYSICAL EXAM:  Blood pressure 108/62, pulse 64, height 5' 4.75\" (1.645 m), weight 65.7 kg, last menstrual period 06/19/2019.  General: Well appearing female, no acute distress  Dermatologic: she has inflammatory papules on upper chest and a few on her chin  HEENT: PERRL, EOMI, no conjunctival injection. No scleral icterus. TM with normal landmarks bilaterally.  Oropharynx  show with moist mucous membranes, clear.  Neck: supple  Nodes: no adenopathy  Breast: Henrique 4     Lungs: Clear to auscultation. No wheezes, rales, rhonchi. Normal work of breathing.  Cardiac: Regular rate and rhythm, normal S1S2, no murmur appreciated.  Abdomen: Soft, nontender, nondistended. Normoactive bowel sounds. No organomegaly or masses.  Genital: Henrique 4 female.  Extremities: Full ROM upper and lower extremities. Warm, well perfused. No clubbing/cyanosis/edema  Back: No scoliosis  Neurologic: Grossly intact. Strength 5/5 bilaterally. Normal tone. Gait normal.    ASSESSMENT: Well 16 year old female adolescent.  Acne. Refilled Clindamycin and Retin A cream  Poor milk intake. Recommended taking her calcium, which she admittedly has not been taking. May be causing shin splints.    BEHAVIOR/GUIDANCE:      Tobacco, alcohol, drug avoidance - DISCUSSED      Sexual activity & avoidance / safe sex - DISCUSSED      Puberty / menstruation - DISCUSSED      Self-breast examination - DISCUSSED      Accident prevention - DISCUSSED      School achievement - DISCUSSED      Family/Peer relationships - DISCUSSED      Regular physical activity - DISCUSSED      Healthy food choices - DISCUSSED    PLAN:  Anticipatory guidance sheet   [employer] ([employer]) participation cleared for 2 year(s)  Immunizations per orders.  Counseling given for each component including the risks, benefits and possible side effects.  Return to clinic in 1 year for Well Child Exam or sooner prn illness/concerns.

## 2022-03-07 NOTE — ED PROVIDER NOTE - OBJECTIVE STATEMENT
89 yo F hx of CKD, hypothyroid, PVD, carotid and b/l femoral stents, on clopidogrel, HTN, recent dx T12 compression fracture, lives alone, pw acute onset right leg pain, inability to walk, no trauma.

## 2022-03-07 NOTE — ED ADULT NURSE NOTE - NSICDXPASTSURGICALHX_GEN_ALL_CORE_FT
PAST SURGICAL HISTORY:  Artificial pacemaker 2016 [facility], [facility]    H/O abdominal hysterectomy     H/O angioplasty PERIPHERAL WITH 1 STENT    S/P tonsillectomy as child

## 2022-03-07 NOTE — ED ADULT NURSE NOTE - OBJECTIVE STATEMENT
pt coming from home.  pt c/o left leg pain x 1 month.  pt fell 1 month ago  Patient to room 15 with daughter at bedside. Patient is alert and oriented times four. Patient's daughter states that she has fallen, been to urgent care and ortho. Pt has been given injection for pain at [facility]. but she c/o right pelvic pain and daughter is concerned because this is a different pain from the back. Pt evaluated by MD. 20 gauge IV lock placed to right antecubital and labs drawn and sent. Waiting for results, further  orders and disposition.  TIFFANIE Zazueta

## 2022-03-07 NOTE — ED PROVIDER NOTE - ATTENDING CONTRIBUTION TO CARE
89 year old with right hip pain and inability to walk. xray to rule out fracture. labs to rule out anemia and electrolyte abn.  likely admit if workup neg as patient unable to ambulate

## 2022-03-07 NOTE — H&P ADULT - PROBLEM SELECTOR PLAN 10
-DVT ppx: Heparin 5000 U SQ q 12 hours   -Ambulate with assistance  -Elevate head of bed  -Aspiration precautions ordered   -Fall precautions ordered   -DASH diet: minced and moist once passes dysphagia screen

## 2022-03-07 NOTE — H&P ADULT - PROBLEM SELECTOR PLAN 7
-DVT ppx:  -Ambulate with assistance  -Elevate head of bed  -Aspiration precautions ordered   -Fall precautions ordered   -DASH diet: minced and moist once passes dysphagia screen -DVT ppx: Heparin 5000 U SQ q 12 hours   -Ambulate with assistance  -Elevate head of bed  -Aspiration precautions ordered   -Fall precautions ordered   -DASH diet: minced and moist once passes dysphagia screen -Patient has hx of bilateral femoral stent placements and R. carotid artery stent placement X2   -Vascular follow up with Dr. Mai scheduled for 6/2022   -Continue with home medications: Clopidogrel and ASA -Continue with home medications   -DASH diet

## 2022-03-08 DIAGNOSIS — R91.8 OTHER NONSPECIFIC ABNORMAL FINDING OF LUNG FIELD: ICD-10-CM

## 2022-03-08 DIAGNOSIS — M25.551 PAIN IN RIGHT HIP: ICD-10-CM

## 2022-03-08 DIAGNOSIS — R01.1 CARDIAC MURMUR, UNSPECIFIED: ICD-10-CM

## 2022-03-08 DIAGNOSIS — R29.898 OTHER SYMPTOMS AND SIGNS INVOLVING THE MUSCULOSKELETAL SYSTEM: ICD-10-CM

## 2022-03-08 LAB
ANION GAP SERPL CALC-SCNC: 12 MMOL/L — SIGNIFICANT CHANGE UP (ref 7–14)
BUN SERPL-MCNC: 56 MG/DL — HIGH (ref 7–23)
CALCIUM SERPL-MCNC: 8.6 MG/DL — SIGNIFICANT CHANGE UP (ref 8.4–10.5)
CHLORIDE SERPL-SCNC: 103 MMOL/L — SIGNIFICANT CHANGE UP (ref 98–107)
CO2 SERPL-SCNC: 24 MMOL/L — SIGNIFICANT CHANGE UP (ref 22–31)
CREAT SERPL-MCNC: 1.21 MG/DL — SIGNIFICANT CHANGE UP (ref 0.5–1.3)
EGFR: 42 ML/MIN/1.73M2 — LOW
GLUCOSE SERPL-MCNC: 97 MG/DL — SIGNIFICANT CHANGE UP (ref 70–99)
HCT VFR BLD CALC: 34.5 % — SIGNIFICANT CHANGE UP (ref 34.5–45)
HGB BLD-MCNC: 11 G/DL — LOW (ref 11.5–15.5)
MAGNESIUM SERPL-MCNC: 3.7 MG/DL — HIGH (ref 1.6–2.6)
MCHC RBC-ENTMCNC: 30 PG — SIGNIFICANT CHANGE UP (ref 27–34)
MCHC RBC-ENTMCNC: 31.9 GM/DL — LOW (ref 32–36)
MCV RBC AUTO: 94 FL — SIGNIFICANT CHANGE UP (ref 80–100)
NRBC # BLD: 0 /100 WBCS — SIGNIFICANT CHANGE UP
NRBC # FLD: 0 K/UL — SIGNIFICANT CHANGE UP
PHOSPHATE SERPL-MCNC: 3.3 MG/DL — SIGNIFICANT CHANGE UP (ref 2.5–4.5)
PLATELET # BLD AUTO: 221 K/UL — SIGNIFICANT CHANGE UP (ref 150–400)
POTASSIUM SERPL-MCNC: 4.4 MMOL/L — SIGNIFICANT CHANGE UP (ref 3.5–5.3)
POTASSIUM SERPL-SCNC: 4.4 MMOL/L — SIGNIFICANT CHANGE UP (ref 3.5–5.3)
RBC # BLD: 3.67 M/UL — LOW (ref 3.8–5.2)
RBC # FLD: 12.8 % — SIGNIFICANT CHANGE UP (ref 10.3–14.5)
SODIUM SERPL-SCNC: 139 MMOL/L — SIGNIFICANT CHANGE UP (ref 135–145)
WBC # BLD: 9.58 K/UL — SIGNIFICANT CHANGE UP (ref 3.8–10.5)
WBC # FLD AUTO: 9.58 K/UL — SIGNIFICANT CHANGE UP (ref 3.8–10.5)

## 2022-03-08 PROCEDURE — 73562 X-RAY EXAM OF KNEE 3: CPT | Mod: 26,50

## 2022-03-08 PROCEDURE — 99233 SBSQ HOSP IP/OBS HIGH 50: CPT | Mod: GC

## 2022-03-08 RX ORDER — LIDOCAINE 4 G/100G
1 CREAM TOPICAL DAILY
Refills: 0 | Status: DISCONTINUED | OUTPATIENT
Start: 2022-03-08 | End: 2022-03-11

## 2022-03-08 RX ADMIN — SPIRONOLACTONE 25 MILLIGRAM(S): 25 TABLET, FILM COATED ORAL at 05:34

## 2022-03-08 RX ADMIN — LOSARTAN POTASSIUM 100 MILLIGRAM(S): 100 TABLET, FILM COATED ORAL at 05:34

## 2022-03-08 RX ADMIN — Medication 1 TABLET(S): at 12:37

## 2022-03-08 RX ADMIN — SENNA PLUS 2 TABLET(S): 8.6 TABLET ORAL at 22:38

## 2022-03-08 RX ADMIN — HEPARIN SODIUM 5000 UNIT(S): 5000 INJECTION INTRAVENOUS; SUBCUTANEOUS at 18:34

## 2022-03-08 RX ADMIN — CLOPIDOGREL BISULFATE 75 MILLIGRAM(S): 75 TABLET, FILM COATED ORAL at 12:38

## 2022-03-08 RX ADMIN — LIDOCAINE 1 PATCH: 4 CREAM TOPICAL at 12:39

## 2022-03-08 RX ADMIN — LIDOCAINE 1 PATCH: 4 CREAM TOPICAL at 06:10

## 2022-03-08 RX ADMIN — Medication 81 MILLIGRAM(S): at 12:37

## 2022-03-08 RX ADMIN — Medication 1 TABLET(S): at 12:38

## 2022-03-08 RX ADMIN — SIMVASTATIN 20 MILLIGRAM(S): 20 TABLET, FILM COATED ORAL at 22:38

## 2022-03-08 RX ADMIN — AMLODIPINE BESYLATE 10 MILLIGRAM(S): 2.5 TABLET ORAL at 05:34

## 2022-03-08 RX ADMIN — POLYETHYLENE GLYCOL 3350 17 GRAM(S): 17 POWDER, FOR SOLUTION ORAL at 12:38

## 2022-03-08 RX ADMIN — LIDOCAINE 1 PATCH: 4 CREAM TOPICAL at 23:41

## 2022-03-08 RX ADMIN — Medication 1 GRAM(S): at 18:34

## 2022-03-08 RX ADMIN — Medication 50 MILLIGRAM(S): at 05:34

## 2022-03-08 RX ADMIN — Medication 88 MICROGRAM(S): at 05:34

## 2022-03-08 RX ADMIN — ESCITALOPRAM OXALATE 10 MILLIGRAM(S): 10 TABLET, FILM COATED ORAL at 12:38

## 2022-03-08 RX ADMIN — Medication 1000 UNIT(S): at 12:38

## 2022-03-08 RX ADMIN — LIDOCAINE 1 PATCH: 4 CREAM TOPICAL at 19:32

## 2022-03-08 NOTE — PROGRESS NOTE ADULT - PROBLEM SELECTOR PLAN 4
Phys exam significant for sys 2/6 heart murmur;  -Screening EKG: V-paced, no acute changes   CT chest no signs of pleural effusion, or volume overload   -ProBNP order  - TTE order   - may try maintenance IVF

## 2022-03-08 NOTE — PHYSICAL THERAPY INITIAL EVALUATION ADULT - DISCHARGE DISPOSITION, PT EVAL
to be determined pending functional assessment check, anticipating Rehab facility , please follow further PT notes.

## 2022-03-08 NOTE — OCCUPATIONAL THERAPY INITIAL EVALUATION ADULT - MD ORDER
Occupational Therapy (OT) to evaluate and treat. Ambulate with assistance. Per TIFFANIE Pascual, pt is okay to participate in OT evaluation and perform activity as tolerated.

## 2022-03-08 NOTE — OCCUPATIONAL THERAPY INITIAL EVALUATION ADULT - LIVES WITH, PROFILE
Pt.'s daughter bedside reports pt. lives with her other daughter in an apartment building with no steps to enter. Once inside, pt.'s daughter reports there is an elevator available to reach apartment located on the 4th floor. Per pt., she has a bathtub in her bathroom, +grab bar.

## 2022-03-08 NOTE — PATIENT PROFILE ADULT - FALL HARM RISK - HARM RISK INTERVENTIONS

## 2022-03-08 NOTE — OCCUPATIONAL THERAPY INITIAL EVALUATION ADULT - PATIENT/FAMILY/SIGNIFICANT OTHER GOALS STATEMENT, OT EVAL
Reviewed surgery packet with pt. In the office. This pt. Is scheduled for Outpatient surgery on 7/29/19 with Dr. Mohan at [facility] under general anesthesia Pro: Davinci assisted Laparoscopic Recurrent Ventral hernia repair with mesh possible open 11410 Dx: Ventral hernia K43.9 pt. Expressed understanding   
Pt. reports she wants to get better.

## 2022-03-08 NOTE — OCCUPATIONAL THERAPY INITIAL EVALUATION ADULT - ADDITIONAL COMMENTS
(See continued from above) Pt reports a fall on 2/3/22, no fractures were identified at the time, however, pain persisted and pt. evaluated by Orthopedic surgeon, who dx patient with compound fracture of T12. Pt now with pain to Right thigh and hip as well. CT Right Hip on 3/7/22 displayed Right-sided sacral, S3 and pubic rami fractures. No femoral neck fracture.

## 2022-03-08 NOTE — OCCUPATIONAL THERAPY INITIAL EVALUATION ADULT - GENERAL OBSERVATIONS, REHAB EVAL
Pt. received semisupine in bed. No acute distress. Patient agreed to evaluation from Occupational Therapist. +Alvino. Daughter bedside. RN bedside.

## 2022-03-08 NOTE — CONSULT NOTE ADULT - ASSESSMENT
AP: 89yFemale with R  pubic rami fractures w/ associated sacral insufficieny fracture  -Pain control  -WBAT  -Repeat Pelvic XR's in 1 week  -Ca/Vit D  -Outpt osteoporosis workup  -No acute orthopedic surgical intervention at this time  -FU with Mahi as outpatient. Call [phone] for appointment

## 2022-03-08 NOTE — PROGRESS NOTE ADULT - SUBJECTIVE AND OBJECTIVE BOX
Nay Maza MD  PGY 2 Department of Internal Medicine  Pager: [phone] ([facility]) /[phone] ([facility])       Patient is a 89y old  Female who presents with a chief complaint of Inability to ambulate due to Rt leg pain; (07 Mar 2022 19:53)      SUBJECTIVE / OVERNIGHT EVENTS: Pt seen and examined. No acute overnight events.         MEDICATIONS  (STANDING):  amLODIPine   Tablet 10 milliGRAM(s) Oral daily  aspirin enteric coated 81 milliGRAM(s) Oral daily  calcium carbonate 1250 mG  + Vitamin D (OsCal 500 + D) 1 Tablet(s) Oral daily  cholecalciferol 1000 Unit(s) Oral daily  clopidogrel Tablet 75 milliGRAM(s) Oral daily  escitalopram 10 milliGRAM(s) Oral daily  heparin   Injectable 5000 Unit(s) SubCutaneous every 12 hours  hydrALAZINE 50 milliGRAM(s) Oral daily  levothyroxine 88 MICROGram(s) Oral daily  lidocaine   4% Patch 1 Patch Transdermal daily  losartan 100 milliGRAM(s) Oral daily  multivitamin 1 Tablet(s) Oral daily  omega-3-Acid Ethyl Esters 1 Gram(s) Oral daily  polyethylene glycol 3350 17 Gram(s) Oral daily  senna 2 Tablet(s) Oral at bedtime  simvastatin 20 milliGRAM(s) Oral at bedtime  spironolactone 25 milliGRAM(s) Oral daily    MEDICATIONS  (PRN):  acetaminophen     Tablet .. 650 milliGRAM(s) Oral every 6 hours PRN Mild Pain (1 - 3), Moderate Pain (4 - 6)      I&O's Summary      Vital Signs Last 24 Hrs  T(C): 36.7 (08 Mar 2022 05:30), Max: 37 (07 Mar 2022 13:25)  T(F): 98 (08 Mar 2022 05:30), Max: 98.6 (07 Mar 2022 13:25)  HR: 80 (08 Mar 2022 05:30) (69 - 89)  BP: 155/55 (08 Mar 2022 05:30) (124/54 - 166/53)  BP(mean): --  RR: 18 (08 Mar 2022 05:30) (18 - 20)  SpO2: 99% (08 Mar 2022 05:30) (97% - 99%)    CAPILLARY BLOOD GLUCOSE          PHYSICAL EXAM:  GENERAL: NAD,   HEAD:  Atraumatic, Normocephalic  EYES: EOMI, PERRL, conjunctiva and sclera clear  NECK: No JVD  CHEST/LUNG: Clear to auscultation bilaterally; No wheeze  HEART: Regular rate and rhythm; No murmurs, rubs, or gallops  ABDOMEN: Soft, Nontender, Nondistended; Bowel sounds present  EXTREMITIES:  2+ Peripheral Pulses, No clubbing, cyanosis, or edema  PSYCH: AAOx3  NEUROLOGY: non-focal  SKIN: No rashes or lesions       LABS:                        11.0   9.58  )-----------( 221      ( 08 Mar 2022 07:02 )             34.5     Auto Eosinophil # x     / Auto Eosinophil % x     / Auto Neutrophil # x     / Auto Neutrophil % x     / BANDS % x                            11.7   10.06 )-----------( 256      ( 07 Mar 2022 16:38 )             35.4     Auto Eosinophil # 0.04  / Auto Eosinophil % 0.4   / Auto Neutrophil # 7.78  / Auto Neutrophil % 77.4  / BANDS % x        03-08    139  |  103  |  56<H>  ----------------------------<  97  4.4   |  24  |  1.21  03-07    138  |  102  |  62<H>  ----------------------------<  122<H>  4.1   |  24  |  1.30    Ca    8.6      08 Mar 2022 07:02  Mg     3.70     03-08  Phos  3.3     03-08  TPro  5.8<L>  /  Alb  3.7  /  TBili  0.4  /  DBili  x   /  AST  21  /  ALT  18  /  AlkPhos  174<H>  03-07                  RADIOLOGY & ADDITIONAL TESTS:    Imaging Personally Reviewed:    Consultant(s) Notes Reviewed:      Care Discussed with Consultants/Other Providers:   Nay Maza MD  PGY 2 Department of Internal Medicine  Pager: [phone] ([facility]) /[phone] ([facility])       Patient is a 89y old  Female who presents with a chief complaint of Inability to ambulate due to Rt leg pain; (07 Mar 2022 19:53)      SUBJECTIVE / OVERNIGHT EVENTS: Pt seen and examined. No acute overnight events.         MEDICATIONS  (STANDING):  amLODIPine   Tablet 10 milliGRAM(s) Oral daily  aspirin enteric coated 81 milliGRAM(s) Oral daily  calcium carbonate 1250 mG  + Vitamin D (OsCal 500 + D) 1 Tablet(s) Oral daily  cholecalciferol 1000 Unit(s) Oral daily  clopidogrel Tablet 75 milliGRAM(s) Oral daily  escitalopram 10 milliGRAM(s) Oral daily  heparin   Injectable 5000 Unit(s) SubCutaneous every 12 hours  hydrALAZINE 50 milliGRAM(s) Oral daily  levothyroxine 88 MICROGram(s) Oral daily  lidocaine   4% Patch 1 Patch Transdermal daily  losartan 100 milliGRAM(s) Oral daily  multivitamin 1 Tablet(s) Oral daily  omega-3-Acid Ethyl Esters 1 Gram(s) Oral daily  polyethylene glycol 3350 17 Gram(s) Oral daily  senna 2 Tablet(s) Oral at bedtime  simvastatin 20 milliGRAM(s) Oral at bedtime  spironolactone 25 milliGRAM(s) Oral daily    MEDICATIONS  (PRN):  acetaminophen     Tablet .. 650 milliGRAM(s) Oral every 6 hours PRN Mild Pain (1 - 3), Moderate Pain (4 - 6)      I&O's Summary      Vital Signs Last 24 Hrs  T(C): 36.7 (08 Mar 2022 05:30), Max: 37 (07 Mar 2022 13:25)  T(F): 98 (08 Mar 2022 05:30), Max: 98.6 (07 Mar 2022 13:25)  HR: 80 (08 Mar 2022 05:30) (69 - 89)  BP: 155/55 (08 Mar 2022 05:30) (124/54 - 166/53)  BP(mean): --  RR: 18 (08 Mar 2022 05:30) (18 - 20)  SpO2: 99% (08 Mar 2022 05:30) (97% - 99%)    CAPILLARY BLOOD GLUCOSE          PHYSICAL EXAM:  GENERAL: NAD, elderly, hard of hearing   HEAD:  Atraumatic, Normocephalic  EYES: conjunctiva and sclera clear  CHEST/LUNG: Clear to auscultation bilaterally; No wheeze  HEART: S1S1 present no LE edema  ABDOMEN: Soft, Nontender, Nondistended; Bowel sounds present  EXTREMITIES:  2+ Peripheral Pulses, No edema,   PSYCH: AAOx3  NEUROLOGY: non-focal, RLE can raise extremity, 3/5, LLE 5.5, non-tender to palpation on R hip or sacral area   SKIN: No rashes or lesions       LABS:                        11.0   9.58  )-----------( 221      ( 08 Mar 2022 07:02 )             34.5     Auto Eosinophil # x     / Auto Eosinophil % x     / Auto Neutrophil # x     / Auto Neutrophil % x     / BANDS % x                            11.7   10.06 )-----------( 256      ( 07 Mar 2022 16:38 )             35.4     Auto Eosinophil # 0.04  / Auto Eosinophil % 0.4   / Auto Neutrophil # 7.78  / Auto Neutrophil % 77.4  / BANDS % x        03-08    139  |  103  |  56<H>  ----------------------------<  97  4.4   |  24  |  1.21  03-07    138  |  102  |  62<H>  ----------------------------<  122<H>  4.1   |  24  |  1.30    Ca    8.6      08 Mar 2022 07:02  Mg     3.70     03-08  Phos  3.3     03-08  TPro  5.8<L>  /  Alb  3.7  /  TBili  0.4  /  DBili  x   /  AST  21  /  ALT  18  /  AlkPhos  174<H>  03-07

## 2022-03-08 NOTE — PHYSICAL THERAPY INITIAL EVALUATION ADULT - LEVEL OF INDEPENDENCE, REHAB EVAL
awaiting for ortho orders and pt's pain management - pt refused at this time - RN Samara and pt's daughter at bedside and is aware./unable to perform

## 2022-03-08 NOTE — PHYSICAL THERAPY INITIAL EVALUATION ADULT - ACTIVE RANGE OF MOTION EXAMINATION, REHAB EVAL
except right hip flexion 0-70 degrees/bilateral upper extremity Active ROM was WFL (within functional limits)/bilateral  lower extremity Active ROM was WFL (within functional limits)

## 2022-03-08 NOTE — PROGRESS NOTE ADULT - PROBLEM SELECTOR PLAN 2
-Patient presenting with progressive R. inner thigh and hip pain 2/2 fall on 2/3 which has led to declining ability to preform ADLs   -Preliminary X-ray of pelvis and R. hip in ED devoid of fractures or dislocation. Follow up final read.  -PT/OT consults   -Ambulate with assistance   -Will likely need discharge to rehab facility   -Fall precautions ordered

## 2022-03-08 NOTE — OCCUPATIONAL THERAPY INITIAL EVALUATION ADULT - DIAGNOSIS, OT EVAL
Compound fracture of T12; Right-sided sacral, S3 and pubic rami fractures; Decreased functional mobility; Decreased ADL management

## 2022-03-08 NOTE — OCCUPATIONAL THERAPY INITIAL EVALUATION ADULT - PERTINENT HX OF CURRENT PROBLEM, REHAB EVAL
Pt is a 89 year old female with hx of hypothyroidism, PVD, HTN, HLD, bilateral femoral stent placements & Right carotid stent placement X 2, who presented to [facility] on 3/7/22 with c/o inability to ambulate. (See continued below)

## 2022-03-08 NOTE — PROGRESS NOTE ADULT - PROBLEM SELECTOR PLAN 1
(+) Rt hip area TTP  triggering guarding;  -Tylenol 650 mg q 6 hours prn for pain control  -Rt CT hip ordered- found Right-sided sacral, S3 and pubic rami fractures. No femoral neck fracture.  -Lidocaine patches QD to Rt inner thigh  -Ortho Sx consult   - Fall precautions   - PT consult

## 2022-03-08 NOTE — PHYSICAL THERAPY INITIAL EVALUATION ADULT - PERTINENT HX OF CURRENT PROBLEM, REHAB EVAL
This is a 89 y/o F w/ M s/p fall a/w Rt hip pain with inability to ambulate. CT Right hip showed R  pubic rami fractures w/ associated sacral insufficieny fracture

## 2022-03-08 NOTE — PATIENT PROFILE ADULT - NSPROIMPLANTSMEDDEV_GEN_A_NUR
Automatic Implantable Cardioverter Defibrillator/Vascular stents/Clips Pacemaker/Vascular stents/Clips

## 2022-03-08 NOTE — PATIENT PROFILE ADULT - ABILITY TO HEAR (WITH HEARING AID OR HEARING APPLIANCE IF NORMALLY USED):
30 Mildly to Moderately Impaired: difficulty hearing in some environments or speaker may need to increase volume or speak distinctly

## 2022-03-08 NOTE — PHYSICAL THERAPY INITIAL EVALUATION ADULT - PATIENT PROFILE REVIEW, REHAB EVAL
ok to perform PT evaluation as per TIFFANIE Pascual however pt needed ortho consult for OOB orders and weightbearing status as per primary team , hence bed mobility/OOB activities on hold at this time./yes

## 2022-03-08 NOTE — PROGRESS NOTE ADULT - PROBLEM SELECTOR PLAN 3
-BUN: SCr ratio 47; Dry oral MM   -Hold hydrochlorothiazide and Furosemide for  24-48hours   -Monitor hydration status  -change home Furosemide frequency on d/c from q24h to q48h (primary team)  No fluids given in ED  - will consider adding IVF - Improving

## 2022-03-08 NOTE — PHYSICAL THERAPY INITIAL EVALUATION ADULT - GENERAL OBSERVATIONS, REHAB EVAL
Patient received semi supine in bed, HOB at 30 degrees, pt is [place] , able to hear on left side , ROM check without any c/o pain, no pain at rest but pt anticipating severe pain when attempt bed mobility at this time and refused , hence not performed ,pt's daughter is at bedside.

## 2022-03-08 NOTE — ED ADULT NURSE REASSESSMENT NOTE - NS ED NURSE REASSESS COMMENT FT1
report given to [facility] . pt transported to [facility]

## 2022-03-08 NOTE — CONSULT NOTE ADULT - SUBJECTIVE AND OBJECTIVE BOX
Orthopedic Surgery Consult Note    89y Female presents c/o R hip/pelvic pain and difficulty with ambulation s/p mechanical fall. Denies numbness/tingling. Denies fever/chills. Denies pain/injury elsewhere.     PAST MEDICAL & SURGICAL HISTORY:  Hypothyroid  last TFT&#x27;s 10/5/17 with Endocrine    Hypertension    Peripheral vascular disease  peripheral stent , on plavix and aspirin and to continue until OR    Arrhythmia  pacemaker - [facility] -2016    Hyperlipidemia    Prophylactic measure    Artificial pacemaker  2016 [facility]    H/O angioplasty  Bilateral femoral stents; R. carotid artery stents      MEDICATIONS  (STANDING):  amLODIPine   Tablet 10 milliGRAM(s) Oral daily  aspirin enteric coated 81 milliGRAM(s) Oral daily  calcium carbonate 1250 mG  + Vitamin D (OsCal 500 + D) 1 Tablet(s) Oral daily  cholecalciferol 1000 Unit(s) Oral daily  clopidogrel Tablet 75 milliGRAM(s) Oral daily  escitalopram 10 milliGRAM(s) Oral daily  heparin   Injectable 5000 Unit(s) SubCutaneous every 12 hours  hydrALAZINE 50 milliGRAM(s) Oral daily  levothyroxine 88 MICROGram(s) Oral daily  lidocaine   4% Patch 1 Patch Transdermal daily  losartan 100 milliGRAM(s) Oral daily  multivitamin 1 Tablet(s) Oral daily  omega-3-Acid Ethyl Esters 1 Gram(s) Oral daily  polyethylene glycol 3350 17 Gram(s) Oral daily  senna 2 Tablet(s) Oral at bedtime  simvastatin 20 milliGRAM(s) Oral at bedtime  spironolactone 25 milliGRAM(s) Oral daily    Allergies    No Known Allergies    Intolerances                              11.0   9.58  )-----------( 221      ( 08 Mar 2022 07:02 )             34.5     08 Mar 2022 07:02    139    |  103    |  56     ----------------------------<  97     4.4     |  24     |  1.21     Ca    8.6        08 Mar 2022 07:02  Phos  3.3       08 Mar 2022 07:02  Mg     3.70      08 Mar 2022 07:02    TPro  5.8    /  Alb  3.7    /  TBili  0.4    /  DBili  x      /  AST  21     /  ALT  18     /  AlkPhos  174    07 Mar 2022 16:38      Vital Signs Last 24 Hrs  T(C): 36.7 (03-08-22 @ 05:30), Max: 37 (03-07-22 @ 15:40)  T(F): 98 (03-08-22 @ 05:30), Max: 98.6 (03-07-22 @ 15:40)  HR: 80 (03-08-22 @ 05:30) (69 - 82)  BP: 155/55 (03-08-22 @ 05:30) (124/54 - 166/53)  BP(mean): --  RR: 18 (03-08-22 @ 05:30) (18 - 20)  SpO2: 99% (03-08-22 @ 05:30) (97% - 99%)    Imaging:   XR Pelvis demonstates R  pubic rami fractures w/ associated sacral insufficieny fracture. No e/o hip fx/dislocation    Physical Exam  Gen: NAD  RLE:   skin intact, difficulty with SLR, neg log roll  +ttp over pubic rami/symphysis  compartments soft  motor intact GS/TA/EHL/FHL  SILT S/S/SP/DP/T, 2+ DP

## 2022-03-09 DIAGNOSIS — K59.00 CONSTIPATION, UNSPECIFIED: ICD-10-CM

## 2022-03-09 LAB
24R-OH-CALCIDIOL SERPL-MCNC: 59.4 NG/ML — SIGNIFICANT CHANGE UP (ref 30–80)
A1C WITH ESTIMATED AVERAGE GLUCOSE RESULT: 5.7 % — HIGH (ref 4–5.6)
ANION GAP SERPL CALC-SCNC: 10 MMOL/L — SIGNIFICANT CHANGE UP (ref 7–14)
BUN SERPL-MCNC: 42 MG/DL — HIGH (ref 7–23)
CALCIUM SERPL-MCNC: 9 MG/DL — SIGNIFICANT CHANGE UP (ref 8.4–10.5)
CHLORIDE SERPL-SCNC: 100 MMOL/L — SIGNIFICANT CHANGE UP (ref 98–107)
CO2 SERPL-SCNC: 26 MMOL/L — SIGNIFICANT CHANGE UP (ref 22–31)
CREAT SERPL-MCNC: 1.13 MG/DL — SIGNIFICANT CHANGE UP (ref 0.5–1.3)
EGFR: 45 ML/MIN/1.73M2 — LOW
ESTIMATED AVERAGE GLUCOSE: 117 — SIGNIFICANT CHANGE UP
GLUCOSE SERPL-MCNC: 120 MG/DL — HIGH (ref 70–99)
HCT VFR BLD CALC: 35.1 % — SIGNIFICANT CHANGE UP (ref 34.5–45)
HGB BLD-MCNC: 11.7 G/DL — SIGNIFICANT CHANGE UP (ref 11.5–15.5)
MAGNESIUM SERPL-MCNC: 3.2 MG/DL — HIGH (ref 1.6–2.6)
MCHC RBC-ENTMCNC: 31.4 PG — SIGNIFICANT CHANGE UP (ref 27–34)
MCHC RBC-ENTMCNC: 33.3 GM/DL — SIGNIFICANT CHANGE UP (ref 32–36)
MCV RBC AUTO: 94.1 FL — SIGNIFICANT CHANGE UP (ref 80–100)
NRBC # BLD: 0 /100 WBCS — SIGNIFICANT CHANGE UP
NRBC # FLD: 0 K/UL — SIGNIFICANT CHANGE UP
PHOSPHATE SERPL-MCNC: 3 MG/DL — SIGNIFICANT CHANGE UP (ref 2.5–4.5)
PLATELET # BLD AUTO: 228 K/UL — SIGNIFICANT CHANGE UP (ref 150–400)
POTASSIUM SERPL-MCNC: 4.7 MMOL/L — SIGNIFICANT CHANGE UP (ref 3.5–5.3)
POTASSIUM SERPL-SCNC: 4.7 MMOL/L — SIGNIFICANT CHANGE UP (ref 3.5–5.3)
RBC # BLD: 3.73 M/UL — LOW (ref 3.8–5.2)
RBC # FLD: 12.6 % — SIGNIFICANT CHANGE UP (ref 10.3–14.5)
SODIUM SERPL-SCNC: 136 MMOL/L — SIGNIFICANT CHANGE UP (ref 135–145)
TSH SERPL-MCNC: 1.92 UIU/ML — SIGNIFICANT CHANGE UP (ref 0.27–4.2)
WBC # BLD: 11.07 K/UL — HIGH (ref 3.8–10.5)
WBC # FLD AUTO: 11.07 K/UL — HIGH (ref 3.8–10.5)

## 2022-03-09 PROCEDURE — 99232 SBSQ HOSP IP/OBS MODERATE 35: CPT | Mod: GC

## 2022-03-09 PROCEDURE — 74019 RADEX ABDOMEN 2 VIEWS: CPT | Mod: 26

## 2022-03-09 RX ORDER — LACTULOSE 10 G/15ML
10 SOLUTION ORAL DAILY
Refills: 0 | Status: DISCONTINUED | OUTPATIENT
Start: 2022-03-09 | End: 2022-03-11

## 2022-03-09 RX ORDER — HYDRALAZINE HCL 50 MG
50 TABLET ORAL DAILY
Refills: 0 | Status: DISCONTINUED | OUTPATIENT
Start: 2022-03-09 | End: 2022-03-11

## 2022-03-09 RX ORDER — AMLODIPINE BESYLATE 2.5 MG/1
10 TABLET ORAL DAILY
Refills: 0 | Status: DISCONTINUED | OUTPATIENT
Start: 2022-03-09 | End: 2022-03-11

## 2022-03-09 RX ADMIN — LIDOCAINE 1 PATCH: 4 CREAM TOPICAL at 11:13

## 2022-03-09 RX ADMIN — LIDOCAINE 1 PATCH: 4 CREAM TOPICAL at 20:17

## 2022-03-09 RX ADMIN — ESCITALOPRAM OXALATE 10 MILLIGRAM(S): 10 TABLET, FILM COATED ORAL at 11:28

## 2022-03-09 RX ADMIN — Medication 50 MILLIGRAM(S): at 11:11

## 2022-03-09 RX ADMIN — LACTULOSE 10 GRAM(S): 10 SOLUTION ORAL at 11:28

## 2022-03-09 RX ADMIN — HEPARIN SODIUM 5000 UNIT(S): 5000 INJECTION INTRAVENOUS; SUBCUTANEOUS at 06:57

## 2022-03-09 RX ADMIN — Medication 88 MICROGRAM(S): at 06:57

## 2022-03-09 RX ADMIN — Medication 1 TABLET(S): at 11:11

## 2022-03-09 RX ADMIN — Medication 81 MILLIGRAM(S): at 11:12

## 2022-03-09 RX ADMIN — Medication 1 TABLET(S): at 11:12

## 2022-03-09 RX ADMIN — SENNA PLUS 2 TABLET(S): 8.6 TABLET ORAL at 22:15

## 2022-03-09 RX ADMIN — HEPARIN SODIUM 5000 UNIT(S): 5000 INJECTION INTRAVENOUS; SUBCUTANEOUS at 18:20

## 2022-03-09 RX ADMIN — LOSARTAN POTASSIUM 100 MILLIGRAM(S): 100 TABLET, FILM COATED ORAL at 06:57

## 2022-03-09 RX ADMIN — Medication 1 GRAM(S): at 11:13

## 2022-03-09 RX ADMIN — LIDOCAINE 1 PATCH: 4 CREAM TOPICAL at 23:16

## 2022-03-09 RX ADMIN — CLOPIDOGREL BISULFATE 75 MILLIGRAM(S): 75 TABLET, FILM COATED ORAL at 11:13

## 2022-03-09 RX ADMIN — SIMVASTATIN 20 MILLIGRAM(S): 20 TABLET, FILM COATED ORAL at 23:15

## 2022-03-09 RX ADMIN — SPIRONOLACTONE 25 MILLIGRAM(S): 25 TABLET, FILM COATED ORAL at 06:57

## 2022-03-09 RX ADMIN — POLYETHYLENE GLYCOL 3350 17 GRAM(S): 17 POWDER, FOR SOLUTION ORAL at 11:12

## 2022-03-09 RX ADMIN — AMLODIPINE BESYLATE 10 MILLIGRAM(S): 2.5 TABLET ORAL at 11:11

## 2022-03-09 NOTE — PROGRESS NOTE ADULT - PROBLEM SELECTOR PLAN 11
-DVT ppx: Heparin 5000 U SQ q 12 hours   -Ambulate with assistance; WBAT on R extremity   -Elevate head of bed  -Aspiration precautions ordered   -Fall precautions ordered   -DASH diet: regular consistency

## 2022-03-09 NOTE — PROGRESS NOTE ADULT - PROBLEM SELECTOR PLAN 3
-BUN: SCr ratio 47; Dry oral MM   -Hold hydrochlorothiazide and Furosemide for  24-48hours   -Monitor hydration status  -change home Furosemide frequency on d/c from q24h to q48h (primary team)  No fluids given in ED

## 2022-03-09 NOTE — PROGRESS NOTE ADULT - PROBLEM SELECTOR PLAN 8
-Continue with home medications   -DASH diet  - Amlodipine 10mg daily, Hydralazine 50 mg daily, Losartan 100 mg daily

## 2022-03-09 NOTE — PROGRESS NOTE ADULT - PROBLEM SELECTOR PLAN 4
Phys exam significant for sys 2/6 heart murmur;  -Screening EKG: V-paced, no acute changes   CT chest no signs of pleural effusion, or volume overload   -ProBNP order  - TTE order

## 2022-03-09 NOTE — PROGRESS NOTE ADULT - PROBLEM SELECTOR PLAN 1
(+) Rt hip area TTP  triggering guarding;  -Tylenol 650 mg q 6 hours prn for pain control  -Rt CT hip ordered- found Right-sided sacral, S3 and pubic rami fractures. No femoral neck fracture.  -Lidocaine patches QD to Rt inner thigh  -Ortho Sx consult - recs appreciated, no surgical intervetions, WBAT   - Fall precautions   - PT consult- likely SANTO  - Xray knees pending

## 2022-03-09 NOTE — PROGRESS NOTE ADULT - SUBJECTIVE AND OBJECTIVE BOX
Nay Maza MD  PGY 2 Department of Internal Medicine  Pager: [phone] ([facility]) /[phone] ([facility])       Patient is a 89y old  Female who presents with a chief complaint of Inability to ambulate due to Rt leg pain; (08 Mar 2022 13:33)      SUBJECTIVE / OVERNIGHT EVENTS: Pt seen and examined. No acute overnight events. Xray knees completed, however pending read         MEDICATIONS  (STANDING):  amLODIPine   Tablet 10 milliGRAM(s) Oral daily  aspirin enteric coated 81 milliGRAM(s) Oral daily  calcium carbonate 1250 mG  + Vitamin D (OsCal 500 + D) 1 Tablet(s) Oral daily  clopidogrel Tablet 75 milliGRAM(s) Oral daily  escitalopram 10 milliGRAM(s) Oral daily  heparin   Injectable 5000 Unit(s) SubCutaneous every 12 hours  hydrALAZINE 50 milliGRAM(s) Oral daily  levothyroxine 88 MICROGram(s) Oral daily  lidocaine   4% Patch 1 Patch Transdermal daily  losartan 100 milliGRAM(s) Oral daily  multivitamin 1 Tablet(s) Oral daily  omega-3-Acid Ethyl Esters 1 Gram(s) Oral daily  polyethylene glycol 3350 17 Gram(s) Oral daily  senna 2 Tablet(s) Oral at bedtime  simvastatin 20 milliGRAM(s) Oral at bedtime  spironolactone 25 milliGRAM(s) Oral daily    MEDICATIONS  (PRN):  acetaminophen     Tablet .. 650 milliGRAM(s) Oral every 6 hours PRN Mild Pain (1 - 3), Moderate Pain (4 - 6)      I&O's Summary    08 Mar 2022 07:01  -  09 Mar 2022 07:00  --------------------------------------------------------  IN: 0 mL / OUT: 1000 mL / NET: -1000 mL        Vital Signs Last 24 Hrs  T(C): 37 (09 Mar 2022 06:48), Max: 37.1 (08 Mar 2022 14:30)  T(F): 98.6 (09 Mar 2022 06:48), Max: 98.7 (08 Mar 2022 14:30)  HR: 61 (09 Mar 2022 06:48) (61 - 70)  BP: 149/50 (09 Mar 2022 06:48) (125/43 - 159/56)  BP(mean): --  RR: 18 (09 Mar 2022 06:48) (18 - 18)  SpO2: 96% (09 Mar 2022 06:48) (96% - 98%)    CAPILLARY BLOOD GLUCOSE          PHYSICAL EXAM:  GENERAL: NAD, elderly, hard of hearing   HEAD:  Atraumatic, Normocephalic  EYES: conjunctiva and sclera clear  CHEST/LUNG: Clear to auscultation bilaterally; No wheeze  HEART: S1S1 present no LE edema  ABDOMEN: Soft, Nontender, Nondistended; Bowel sounds present  EXTREMITIES:  2+ Peripheral Pulses, No edema,   PSYCH: AAOx3  NEUROLOGY: non-focal, RLE can raise extremity, 3/5, LLE 5.5, non-tender to palpation on R hip or sacral area   SKIN: No rashes or lesions       LABS:                        11.7   11.07 )-----------( 228      ( 09 Mar 2022 07:15 )             35.1     Auto Eosinophil # x     / Auto Eosinophil % x     / Auto Neutrophil # x     / Auto Neutrophil % x     / BANDS % x                            11.0   9.58  )-----------( 221      ( 08 Mar 2022 07:02 )             34.5     Auto Eosinophil # x     / Auto Eosinophil % x     / Auto Neutrophil # x     / Auto Neutrophil % x     / BANDS % x                            11.7   10.06 )-----------( 256      ( 07 Mar 2022 16:38 )             35.4     Auto Eosinophil # 0.04  / Auto Eosinophil % 0.4   / Auto Neutrophil # 7.78  / Auto Neutrophil % 77.4  / BANDS % x        03-08    139  |  103  |  56<H>  ----------------------------<  97  4.4   |  24  |  1.21  03-07    138  |  102  |  62<H>  ----------------------------<  122<H>  4.1   |  24  |  1.30    Ca    8.6      08 Mar 2022 07:02  Mg     3.70     03-08  Phos  3.3     03-08  TPro  5.8<L>  /  Alb  3.7  /  TBili  0.4  /  DBili  x   /  AST  21  /  ALT  18  /  AlkPhos  174<H>  03-07

## 2022-03-10 ENCOUNTER — TRANSCRIPTION ENCOUNTER (OUTPATIENT)
Age: 87
End: 2022-03-10

## 2022-03-10 LAB
ANION GAP SERPL CALC-SCNC: 11 MMOL/L — SIGNIFICANT CHANGE UP (ref 7–14)
BUN SERPL-MCNC: 35 MG/DL — HIGH (ref 7–23)
CALCIUM SERPL-MCNC: 9.7 MG/DL — SIGNIFICANT CHANGE UP (ref 8.4–10.5)
CHLORIDE SERPL-SCNC: 100 MMOL/L — SIGNIFICANT CHANGE UP (ref 98–107)
CO2 SERPL-SCNC: 24 MMOL/L — SIGNIFICANT CHANGE UP (ref 22–31)
CREAT SERPL-MCNC: 1.11 MG/DL — SIGNIFICANT CHANGE UP (ref 0.5–1.3)
EGFR: 46 ML/MIN/1.73M2 — LOW
GLUCOSE SERPL-MCNC: 116 MG/DL — HIGH (ref 70–99)
HCT VFR BLD CALC: 35.6 % — SIGNIFICANT CHANGE UP (ref 34.5–45)
HGB BLD-MCNC: 11.4 G/DL — LOW (ref 11.5–15.5)
MAGNESIUM SERPL-MCNC: 2.7 MG/DL — HIGH (ref 1.6–2.6)
MCHC RBC-ENTMCNC: 30.2 PG — SIGNIFICANT CHANGE UP (ref 27–34)
MCHC RBC-ENTMCNC: 32 GM/DL — SIGNIFICANT CHANGE UP (ref 32–36)
MCV RBC AUTO: 94.4 FL — SIGNIFICANT CHANGE UP (ref 80–100)
NRBC # BLD: 0 /100 WBCS — SIGNIFICANT CHANGE UP
NRBC # FLD: 0 K/UL — SIGNIFICANT CHANGE UP
PHOSPHATE SERPL-MCNC: 3.1 MG/DL — SIGNIFICANT CHANGE UP (ref 2.5–4.5)
PLATELET # BLD AUTO: 219 K/UL — SIGNIFICANT CHANGE UP (ref 150–400)
POTASSIUM SERPL-MCNC: 4.8 MMOL/L — SIGNIFICANT CHANGE UP (ref 3.5–5.3)
POTASSIUM SERPL-SCNC: 4.8 MMOL/L — SIGNIFICANT CHANGE UP (ref 3.5–5.3)
RBC # BLD: 3.77 M/UL — LOW (ref 3.8–5.2)
RBC # FLD: 12.6 % — SIGNIFICANT CHANGE UP (ref 10.3–14.5)
SARS-COV-2 RNA SPEC QL NAA+PROBE: SIGNIFICANT CHANGE UP
SODIUM SERPL-SCNC: 135 MMOL/L — SIGNIFICANT CHANGE UP (ref 135–145)
WBC # BLD: 9.41 K/UL — SIGNIFICANT CHANGE UP (ref 3.8–10.5)
WBC # FLD AUTO: 9.41 K/UL — SIGNIFICANT CHANGE UP (ref 3.8–10.5)

## 2022-03-10 PROCEDURE — 99232 SBSQ HOSP IP/OBS MODERATE 35: CPT | Mod: GC

## 2022-03-10 PROCEDURE — 93306 TTE W/DOPPLER COMPLETE: CPT | Mod: 26

## 2022-03-10 RX ORDER — CHOLECALCIFEROL (VITAMIN D3) 125 MCG
1 CAPSULE ORAL
Qty: 0 | Refills: 0 | DISCHARGE

## 2022-03-10 RX ORDER — POLYETHYLENE GLYCOL 3350 17 G/17G
17 POWDER, FOR SOLUTION ORAL
Qty: 0 | Refills: 0 | DISCHARGE
Start: 2022-03-10

## 2022-03-10 RX ORDER — ACETAMINOPHEN 500 MG
2 TABLET ORAL
Qty: 0 | Refills: 0 | DISCHARGE
Start: 2022-03-10

## 2022-03-10 RX ORDER — SPIRONOLACT/HYDROCHLOROTHIAZID 25 MG-25MG
1 TABLET ORAL
Qty: 0 | Refills: 0 | DISCHARGE

## 2022-03-10 RX ORDER — LIDOCAINE 4 G/100G
1 CREAM TOPICAL
Qty: 0 | Refills: 0 | DISCHARGE
Start: 2022-03-10

## 2022-03-10 RX ORDER — SENNA PLUS 8.6 MG/1
2 TABLET ORAL
Qty: 0 | Refills: 0 | DISCHARGE
Start: 2022-03-10

## 2022-03-10 RX ORDER — LACTULOSE 10 G/15ML
15 SOLUTION ORAL
Qty: 0 | Refills: 0 | DISCHARGE
Start: 2022-03-10

## 2022-03-10 RX ORDER — SPIRONOLACTONE 25 MG/1
1 TABLET, FILM COATED ORAL
Qty: 0 | Refills: 0 | DISCHARGE
Start: 2022-03-10

## 2022-03-10 RX ADMIN — ESCITALOPRAM OXALATE 10 MILLIGRAM(S): 10 TABLET, FILM COATED ORAL at 11:52

## 2022-03-10 RX ADMIN — HEPARIN SODIUM 5000 UNIT(S): 5000 INJECTION INTRAVENOUS; SUBCUTANEOUS at 05:52

## 2022-03-10 RX ADMIN — LOSARTAN POTASSIUM 100 MILLIGRAM(S): 100 TABLET, FILM COATED ORAL at 05:52

## 2022-03-10 RX ADMIN — Medication 1 GRAM(S): at 11:52

## 2022-03-10 RX ADMIN — Medication 81 MILLIGRAM(S): at 11:53

## 2022-03-10 RX ADMIN — CLOPIDOGREL BISULFATE 75 MILLIGRAM(S): 75 TABLET, FILM COATED ORAL at 11:51

## 2022-03-10 RX ADMIN — AMLODIPINE BESYLATE 10 MILLIGRAM(S): 2.5 TABLET ORAL at 05:53

## 2022-03-10 RX ADMIN — Medication 1 TABLET(S): at 11:52

## 2022-03-10 RX ADMIN — POLYETHYLENE GLYCOL 3350 17 GRAM(S): 17 POWDER, FOR SOLUTION ORAL at 11:51

## 2022-03-10 RX ADMIN — SIMVASTATIN 20 MILLIGRAM(S): 20 TABLET, FILM COATED ORAL at 22:04

## 2022-03-10 RX ADMIN — Medication 50 MILLIGRAM(S): at 05:53

## 2022-03-10 RX ADMIN — Medication 1 TABLET(S): at 11:50

## 2022-03-10 RX ADMIN — SPIRONOLACTONE 25 MILLIGRAM(S): 25 TABLET, FILM COATED ORAL at 05:52

## 2022-03-10 RX ADMIN — LACTULOSE 10 GRAM(S): 10 SOLUTION ORAL at 12:16

## 2022-03-10 RX ADMIN — SENNA PLUS 2 TABLET(S): 8.6 TABLET ORAL at 22:04

## 2022-03-10 RX ADMIN — HEPARIN SODIUM 5000 UNIT(S): 5000 INJECTION INTRAVENOUS; SUBCUTANEOUS at 18:01

## 2022-03-10 RX ADMIN — Medication 88 MICROGRAM(S): at 05:52

## 2022-03-10 NOTE — PROGRESS NOTE ADULT - PROBLEM SELECTOR PLAN 4
Phys exam significant for sys 2/6 heart murmur;  -Screening EKG: V-paced, no acute changes   CT chest no signs of pleural effusion, or volume overload   -ProBNP order  - TTE order Phys exam significant for sys 2/6 heart murmur;  -Screening EKG: V-paced, no acute changes   CT chest no signs of pleural effusion, or volume overload

## 2022-03-10 NOTE — PROGRESS NOTE ADULT - PROBLEM SELECTOR PLAN 6
Chronic constipation  - on senna, miralax  - added lactulose daily   - AXR to assess stool burden Chronic constipation  - on senna, miralax  - added lactulose daily   - AXR - small stool burden and non-obstructive gas pattern   - Large BM today (3/10)  - no need for free water enema at this time

## 2022-03-10 NOTE — DISCHARGE NOTE PROVIDER - HOSPITAL COURSE
89 y/o domiciled female w/ MHx of hypothyroidism, PVD, HTN, HLD, and history of bilateral femoral stent placements and well as R. carotid stent placement X 2 presents to ED with complaint of inability to ambulate X 1 day. Reports a fall on 2/3/22 where she fell on carpeted mirna. No fractures were identified at the time of evaluation and patient was sent home. As per daughter and patient Rt leg pain did not mitigate with time so they sought a second opinion at urgent care where more imaging was done which was also negative for fractures. Patient was evaluated by Orthopedic surgeon, Dr. Guadalupe who dx patient with compound fracture of T12. As per patient's daughter the physician gave her a steroid injection and placed her on a course of oral steroids, which caused relief of pain but resulted in "slight" confusion. Patient lives in an apartment on her own and is able to perform ADLs. She ambulates with a walker at baseline and was able to walk with increasing difficulty following the fall. Was enrolled in home health aide to be initiated  tomorrow preceding the pain. As per patient, the pain is isolated to the Rt  inner thigh and the right hip. Reports no pain with movement of the leg in bed, applying weight on the leg results in "shooting pain", 10/10, leading to inability to walk.  Reports no associated loss of sensation or bowel/bladder incontinence.     Hospital Course:     Xray of hips and knees did not show any fractures. However, CT scan of hip showed Right-sided sacral, S3 and pubic rami fractures. No femoral neck fracture. Orthopedics was consulted, no surgical intervention at this time , weight bearing as tolerated. Patient will require repeat hip Xray in one week post discharge to monitor for anymore fracturea. PT consulted on patient, recommended [facility] ([facility] for patient. Pain has been controlled with Tylenol and Lidocaine patch   Patient also presented with TANNER, diuretic HCTZ was held.   Patient also endorsed constipation, started on senna, miralax, and lactulose daily, had 2 large bowel movements with this regimen, AXR showed Nonobstructive bowel gas pattern with a small stool burden.      Patient stable and medically optimized for discharge to [facility] ([facility]

## 2022-03-10 NOTE — PROGRESS NOTE ADULT - PROBLEM SELECTOR PLAN 2
-Patient presenting with progressive R. inner thigh and hip pain 2/2 fall on 2/3 which has led to declining ability to preform ADLs   -Preliminary X-ray of pelvis and R. hip in ED   - CT hip showed pubic rami fracture   -PT/OT consults - [facility]   -Ambulate with assistance   -Will likely need discharge to rehab facility   -Fall precautions ordered -Patient presenting with progressive R. inner thigh and hip pain 2/2 fall on 2/3 which has led to declining ability to preform ADLs   -Preliminary X-ray of pelvis and R. hip in ED   - CT hip showed pubic rami fracture   -PT/OT consults - [facility]   -Ambulate with assistance   -[facility]   -Fall precautions ordered

## 2022-03-10 NOTE — DISCHARGE NOTE PROVIDER - NSDCMRMEDTOKEN_GEN_ALL_CORE_FT
acetaminophen 325 mg oral tablet: 2 tab(s) orally every 6 hours, As needed, Mild Pain (1 - 3), Moderate Pain (4 - 6)  amLODIPine 10 mg oral tablet: 1 tab(s) orally once a day am  Aspir 81 oral delayed release tablet: 1 tab(s) orally once a day AM  calcium-vitamin D 500 mg-5 mcg (200 intl units) oral tablet: 1 tab(s) orally once a day  clopidogrel 75 mg oral tablet: 1 tab(s) orally once a day at 6pm  CoQ10: 100 milligram(s) orally once a day PM  hydrALAZINE 50 mg oral tablet: orally once a day AM  irbesartan 300 mg oral tablet: 1 tab(s) orally once a day  lactulose 10 g/15 mL oral syrup: 15 milliliter(s) orally once a day  levothyroxine 88 mcg (0.088 mg) oral tablet: 1 tab(s) orally once a day AM  Lexapro 10 mg oral tablet: 1 tab(s) orally once a day AM  lidocaine 4% topical film: Apply topically to affected area once a day for leg pain   Multiple Vitamins oral tablet: 1 tab(s) orally once a day  polyethylene glycol 3350 oral powder for reconstitution: 17 gram(s) orally once a day  senna oral tablet: 2 tab(s) orally once a day (at bedtime)  simvastatin 20 mg oral tablet: 1 tab(s) orally once a day (at bedtime)  spironolactone 25 mg oral tablet: 1 tab(s) orally once a day

## 2022-03-10 NOTE — PROGRESS NOTE ADULT - SUBJECTIVE AND OBJECTIVE BOX
Nay Maza MD  PGY 2 Department of Internal Medicine    Patient is a 89y old  Female who presents with a chief complaint of Inability to ambulate due to Rt leg pain; (09 Mar 2022 07:46)      SUBJECTIVE / OVERNIGHT EVENTS: Pt seen and examined. No acute overnight events. Had one BM overnight overnight         MEDICATIONS  (STANDING):  amLODIPine   Tablet 10 milliGRAM(s) Oral daily  aspirin enteric coated 81 milliGRAM(s) Oral daily  calcium carbonate 1250 mG  + Vitamin D (OsCal 500 + D) 1 Tablet(s) Oral daily  clopidogrel Tablet 75 milliGRAM(s) Oral daily  escitalopram 10 milliGRAM(s) Oral daily  heparin   Injectable 5000 Unit(s) SubCutaneous every 12 hours  hydrALAZINE 50 milliGRAM(s) Oral daily  lactulose Syrup 10 Gram(s) Oral daily  levothyroxine 88 MICROGram(s) Oral daily  lidocaine   4% Patch 1 Patch Transdermal daily  losartan 100 milliGRAM(s) Oral daily  multivitamin 1 Tablet(s) Oral daily  omega-3-Acid Ethyl Esters 1 Gram(s) Oral daily  polyethylene glycol 3350 17 Gram(s) Oral daily  senna 2 Tablet(s) Oral at bedtime  simvastatin 20 milliGRAM(s) Oral at bedtime  spironolactone 25 milliGRAM(s) Oral daily    MEDICATIONS  (PRN):  acetaminophen     Tablet .. 650 milliGRAM(s) Oral every 6 hours PRN Mild Pain (1 - 3), Moderate Pain (4 - 6)      I&O's Summary    09 Mar 2022 07:01  -  10 Mar 2022 07:00  --------------------------------------------------------  IN: 100 mL / OUT: 400 mL / NET: -300 mL        Vital Signs Last 24 Hrs  T(C): 36.8 (10 Mar 2022 06:15), Max: 36.9 (09 Mar 2022 10:40)  T(F): 98.2 (10 Mar 2022 06:15), Max: 98.5 (09 Mar 2022 10:40)  HR: 62 (10 Mar 2022 06:15) (61 - 65)  BP: 132/56 (10 Mar 2022 06:15) (119/70 - 149/54)  BP(mean): --  RR: 19 (10 Mar 2022 06:15) (17 - 19)  SpO2: 97% (10 Mar 2022 06:15) (96% - 100%)    CAPILLARY BLOOD GLUCOSE          PHYSICAL EXAM:  GENERAL: NAD, elderly, hard of hearing   HEAD:  Atraumatic, Normocephalic  EYES: conjunctiva and sclera clear  CHEST/LUNG: Clear to auscultation bilaterally; No wheeze  HEART: S1S1 present no LE edema  ABDOMEN: Soft, Nontender, Nondistended; Bowel sounds present  EXTREMITIES:  2+ Peripheral Pulses, No edema,   PSYCH: AAOx3  NEUROLOGY: non-focal, RLE can raise extremity, 3/5, LLE 5.5, non-tender to palpation on R hip or sacral area   SKIN: No rashes or lesions       LABS:                        11.4   9.41  )-----------( 219      ( 10 Mar 2022 06:50 )             35.6     Auto Eosinophil # x     / Auto Eosinophil % x     / Auto Neutrophil # x     / Auto Neutrophil % x     / BANDS % x                            11.7   11.07 )-----------( 228      ( 09 Mar 2022 07:15 )             35.1     Auto Eosinophil # x     / Auto Eosinophil % x     / Auto Neutrophil # x     / Auto Neutrophil % x     / BANDS % x        03-10    135  |  100  |  35<H>  ----------------------------<  116<H>  4.8   |  24  |  1.11  03-09    136  |  100  |  42<H>  ----------------------------<  120<H>  4.7   |  26  |  1.13    Ca    9.7      10 Mar 2022 06:50  Mg     2.70     03-10  Phos  3.1     03-10

## 2022-03-10 NOTE — PROGRESS NOTE ADULT - PROBLEM SELECTOR PLAN 1
(+) Rt hip area TTP  triggering guarding;  -Tylenol 650 mg q 6 hours prn for pain control  -Rt CT hip ordered- found Right-sided sacral, S3 and pubic rami fractures. No femoral neck fracture.  -Lidocaine patches QD to Rt inner thigh  -Ortho Sx consult - recs appreciated, no surgical intervetions, WBAT   - Fall precautions   - PT consult- [facility], pending acceptance to [facility]   - Xray knees - osteopenia, no fractures   - Will require repeat hip xray post discharge in one week   - Will follow up with Dr. Champagne outpatient ortho (+) Rt hip area TTP  triggering guarding;  -Tylenol 650 mg q 6 hours prn for pain control  -Rt CT hip ordered- found Right-sided sacral, S3 and pubic rami fractures. No femoral neck fracture.  -Lidocaine patches QD to Rt inner thigh  -Ortho Sx consult - recs appreciated, no surgical intervetions, WBAT   - Fall precautions   - PT consult- [facility], accepted   - Xray knees - osteopenia, no fractures   - Will require repeat hip xray post discharge in one week   - Will follow up with Dr. Champagne outpatient ortho

## 2022-03-10 NOTE — DISCHARGE NOTE PROVIDER - NSDCCPCAREPLAN_GEN_ALL_CORE_FT
PRINCIPAL DISCHARGE DIAGNOSIS  Diagnosis: Fracture of multiple pubic rami  Assessment and Plan of Treatment: You presented to the hospital after a fall because of inability to ambulate. We completed Xrays of yiur hip and knees which did not show fractures. However when we completed a CT scan of your hip we found pubic rami fracture. We called orthopedic surgery who said there is no surgical intervention at this time, continue weight bearing as tolerated. You will require a repeat hip xray a week after discharge and orthopedics follow up   Please continue to work with physical therapy and take tylenol and lidocaine patch for pain      SECONDARY DISCHARGE DIAGNOSES  Diagnosis: TANNER (acute kidney injury)  Assessment and Plan of Treatment: Your kidney numbers were slightly elevated during your hospitalization. You were likely dehydrated. We stopped your water pill, hydrochlorothiazide which improved your symptoms. Please continue to drink fluids. And continue to hold your hydrochlorothiazide until your doctor wants to resume it.    Diagnosis: Constipation  Assessment and Plan of Treatment: You were experiencing constipation while here. Miralax, senna, and lactulose daily helped give you bowel movements. Abdominal xray was within normal limits. Please continue taking the above medications as needed for constipation

## 2022-03-10 NOTE — PROGRESS NOTE ADULT - PROBLEM SELECTOR PLAN 8
-Continue with home medications   -DASH diet  - Amlodipine 10mg daily, Hydralazine 50 mg daily, Losartan 100 mg daily -Continue with home medications   -DASH diet  - Amlodipine 10mg daily, Hydralazine 50 mg daily, Losartan 100 mg daily, spironolactone 25mg daily

## 2022-03-10 NOTE — PROGRESS NOTE ADULT - PROBLEM SELECTOR PLAN 3
-BUN: SCr ratio 47; Dry oral MM   -Hold diuretics   -Monitor hydration status  No fluids given in ED  - TANNER improved  - May resume diuretic upon discharge -BUN: SCr ratio 47; Dry oral MM   -Hold diuretics   -Monitor hydration status  No fluids given in ED  - TANNER improved  - Hold HCTZ upon discharge, can continue all other meds including spironolactone

## 2022-03-10 NOTE — DISCHARGE NOTE PROVIDER - CARE PROVIDER_API CALL
Carter Champagne)  Orthopaedic Sports Medicine; Orthopaedic Surgery  [address] [place] [address]  Phone: [phone]  Fax: [phone]  Follow Up Time:

## 2022-03-10 NOTE — PROGRESS NOTE ADULT - PROBLEM SELECTOR PLAN 11
-DVT ppx: Heparin 5000 U SQ q 12 hours   -Ambulate with assistance; WBAT on R extremity   -Elevate head of bed  -Aspiration precautions ordered   -Fall precautions ordered   -DASH diet: regular consistency  - Dispo: pending [facility] -DVT ppx: Heparin 5000 U SQ q 12 hours   -Ambulate with assistance; WBAT on R extremity   -Elevate head of bed  -Aspiration precautions ordered   -Fall precautions ordered   -DASH diet: regular consistency  - Dispo: EM[facility] to [facility] Friday 3/11

## 2022-03-11 ENCOUNTER — TRANSCRIPTION ENCOUNTER (OUTPATIENT)
Age: 87
End: 2022-03-11

## 2022-03-11 VITALS
HEART RATE: 90 BPM | TEMPERATURE: 98 F | RESPIRATION RATE: 18 BRPM | DIASTOLIC BLOOD PRESSURE: 54 MMHG | SYSTOLIC BLOOD PRESSURE: 131 MMHG | OXYGEN SATURATION: 95 %

## 2022-03-11 LAB
ANION GAP SERPL CALC-SCNC: 10 MMOL/L — SIGNIFICANT CHANGE UP (ref 7–14)
BUN SERPL-MCNC: 30 MG/DL — HIGH (ref 7–23)
CALCIUM SERPL-MCNC: 9.4 MG/DL — SIGNIFICANT CHANGE UP (ref 8.4–10.5)
CHLORIDE SERPL-SCNC: 100 MMOL/L — SIGNIFICANT CHANGE UP (ref 98–107)
CO2 SERPL-SCNC: 25 MMOL/L — SIGNIFICANT CHANGE UP (ref 22–31)
CREAT SERPL-MCNC: 1.08 MG/DL — SIGNIFICANT CHANGE UP (ref 0.5–1.3)
EGFR: 48 ML/MIN/1.73M2 — LOW
GLUCOSE SERPL-MCNC: 96 MG/DL — SIGNIFICANT CHANGE UP (ref 70–99)
HCT VFR BLD CALC: 34.8 % — SIGNIFICANT CHANGE UP (ref 34.5–45)
HGB BLD-MCNC: 10.9 G/DL — LOW (ref 11.5–15.5)
MAGNESIUM SERPL-MCNC: 2.5 MG/DL — SIGNIFICANT CHANGE UP (ref 1.6–2.6)
MCHC RBC-ENTMCNC: 29.9 PG — SIGNIFICANT CHANGE UP (ref 27–34)
MCHC RBC-ENTMCNC: 31.3 GM/DL — LOW (ref 32–36)
MCV RBC AUTO: 95.3 FL — SIGNIFICANT CHANGE UP (ref 80–100)
NRBC # BLD: 0 /100 WBCS — SIGNIFICANT CHANGE UP
NRBC # FLD: 0 K/UL — SIGNIFICANT CHANGE UP
PHOSPHATE SERPL-MCNC: 3.1 MG/DL — SIGNIFICANT CHANGE UP (ref 2.5–4.5)
PLATELET # BLD AUTO: 206 K/UL — SIGNIFICANT CHANGE UP (ref 150–400)
POTASSIUM SERPL-MCNC: 4.4 MMOL/L — SIGNIFICANT CHANGE UP (ref 3.5–5.3)
POTASSIUM SERPL-SCNC: 4.4 MMOL/L — SIGNIFICANT CHANGE UP (ref 3.5–5.3)
RBC # BLD: 3.65 M/UL — LOW (ref 3.8–5.2)
RBC # FLD: 12.6 % — SIGNIFICANT CHANGE UP (ref 10.3–14.5)
SODIUM SERPL-SCNC: 135 MMOL/L — SIGNIFICANT CHANGE UP (ref 135–145)
WBC # BLD: 8.82 K/UL — SIGNIFICANT CHANGE UP (ref 3.8–10.5)
WBC # FLD AUTO: 8.82 K/UL — SIGNIFICANT CHANGE UP (ref 3.8–10.5)

## 2022-03-11 PROCEDURE — 99239 HOSP IP/OBS DSCHRG MGMT >30: CPT

## 2022-03-11 RX ADMIN — HEPARIN SODIUM 5000 UNIT(S): 5000 INJECTION INTRAVENOUS; SUBCUTANEOUS at 07:28

## 2022-03-11 RX ADMIN — Medication 1 TABLET(S): at 12:11

## 2022-03-11 RX ADMIN — LOSARTAN POTASSIUM 100 MILLIGRAM(S): 100 TABLET, FILM COATED ORAL at 07:29

## 2022-03-11 RX ADMIN — Medication 81 MILLIGRAM(S): at 12:12

## 2022-03-11 RX ADMIN — Medication 88 MICROGRAM(S): at 07:29

## 2022-03-11 RX ADMIN — Medication 1 GRAM(S): at 12:11

## 2022-03-11 RX ADMIN — AMLODIPINE BESYLATE 10 MILLIGRAM(S): 2.5 TABLET ORAL at 07:28

## 2022-03-11 RX ADMIN — SPIRONOLACTONE 25 MILLIGRAM(S): 25 TABLET, FILM COATED ORAL at 07:29

## 2022-03-11 RX ADMIN — LIDOCAINE 1 PATCH: 4 CREAM TOPICAL at 12:13

## 2022-03-11 RX ADMIN — POLYETHYLENE GLYCOL 3350 17 GRAM(S): 17 POWDER, FOR SOLUTION ORAL at 12:12

## 2022-03-11 RX ADMIN — Medication 1 TABLET(S): at 12:12

## 2022-03-11 RX ADMIN — ESCITALOPRAM OXALATE 10 MILLIGRAM(S): 10 TABLET, FILM COATED ORAL at 12:11

## 2022-03-11 RX ADMIN — Medication 50 MILLIGRAM(S): at 07:29

## 2022-03-11 RX ADMIN — CLOPIDOGREL BISULFATE 75 MILLIGRAM(S): 75 TABLET, FILM COATED ORAL at 12:11

## 2022-03-11 RX ADMIN — LACTULOSE 10 GRAM(S): 10 SOLUTION ORAL at 12:12

## 2022-03-11 NOTE — PROGRESS NOTE ADULT - PROBLEM SELECTOR PROBLEM 10
Peripheral vascular disease
Prophylactic measure
Peripheral vascular disease
Peripheral vascular disease

## 2022-03-11 NOTE — PROGRESS NOTE ADULT - ASSESSMENT
89 y/o domiciled female w/ MH of hypothyroidism, PVD, HTN, HLD, and history of bilateral femoral stent placements and well as R. carotid stent placement X 2 a/w Rt hip pain with inability to ambulate. Found also to be dehydrated and to have a sys heart murmur; 
89 y/o domiciled female w/ MH of hypothyroidism, PVD, HTN, HLD, and history of bilateral femoral stent placements and well as R. carotid stent placement X 2 a/w Rt hip pain with inability to ambulate. Found also to be dehydrated and to have a sys heart murmur; 
89 y/o domiciled female w/ MH of hypothyroidism, PVD, HTN, HLD, and history of bilateral femoral stent placements and well as R. carotid stent placement X 2 a/w Rt hip pain with inability to ambulate. Found also to be dehydrated and to have a sys heart murmur; 
89 y/o domiciled female w/ MH of hypothyroidism, PVD, HTN, HLD, and history of bilateral femoral stent placements and well as R. carotid stent placement X 2 a/w Rt hip pain with inability to ambulate. Found also to be dehydrated and to have a sys heart murmur;

## 2022-03-11 NOTE — PROGRESS NOTE ADULT - PROBLEM SELECTOR PLAN 8
-Continue with home medications   -DASH diet  - Amlodipine 10mg daily, Hydralazine 50 mg daily, Losartan 100 mg daily, spironolactone 25mg daily

## 2022-03-11 NOTE — PROGRESS NOTE ADULT - PROBLEM SELECTOR PLAN 6
Chronic constipation  - on senna, miralax  - added lactulose daily   - AXR - small stool burden and non-obstructive gas pattern   - Large BM today (3/10)  - no need for free water enema at this time

## 2022-03-11 NOTE — PROGRESS NOTE ADULT - PROBLEM SELECTOR PLAN 1
(+) Rt hip area TTP  triggering guarding;  -Tylenol 650 mg q 6 hours prn for pain control  -Rt CT hip ordered- found Right-sided sacral, S3 and pubic rami fractures. No femoral neck fracture.  -Lidocaine patches QD to Rt inner thigh  -Ortho Sx consult - recs appreciated, no surgical intervetions, WBAT   - Fall precautions   - PT consult- [facility], accepted   - Xray knees - osteopenia, no fractures   - Will require repeat hip xray post discharge in one week   - Will follow up with Dr. Champagne outpatient ortho

## 2022-03-11 NOTE — PROGRESS NOTE ADULT - PROBLEM SELECTOR PLAN 10
-Patient has hx of bilateral femoral stent placements and R. carotid artery stent placement X2   -Vascular follow up with Dr. Mai scheduled for 6/2022   -Continue with home medications: Clopidogrel and ASA
-DVT ppx: Heparin 5000 U SQ q 12 hours   -Ambulate with assistance  -Elevate head of bed  -Aspiration precautions ordered   -Fall precautions ordered   -DASH diet: minced and moist
-Patient has hx of bilateral femoral stent placements and R. carotid artery stent placement X2   -Vascular follow up with Dr. Mai scheduled for 6/2022   -Continue with home medications: Clopidogrel and ASA
-Patient has hx of bilateral femoral stent placements and R. carotid artery stent placement X2   -Vascular follow up with Dr. Mai scheduled for 6/2022   -Continue with home medications: Clopidogrel and ASA

## 2022-03-11 NOTE — PROGRESS NOTE ADULT - REASON FOR ADMISSION
Inability to ambulate due to Rt leg pain;

## 2022-03-11 NOTE — PROGRESS NOTE ADULT - PROBLEM SELECTOR PLAN 2
-Patient presenting with progressive R. inner thigh and hip pain 2/2 fall on 2/3 which has led to declining ability to preform ADLs   -Preliminary X-ray of pelvis and R. hip in ED   - CT hip showed pubic rami fracture   -PT/OT consults - [facility]   -Ambulate with assistance   -[facility]   -Fall precautions ordered

## 2022-03-11 NOTE — PROGRESS NOTE ADULT - PROBLEM SELECTOR PROBLEM 4
Heart murmur, categorized by timing

## 2022-03-11 NOTE — PROGRESS NOTE ADULT - SUBJECTIVE AND OBJECTIVE BOX
Follow Up:      Interval History/ROS:    Allergies  No Known Allergies        ANTIMICROBIALS:      OTHER MEDS:  MEDICATIONS  (STANDING):  acetaminophen     Tablet .. 650 every 6 hours PRN  amLODIPine   Tablet 10 daily  aspirin enteric coated 81 daily  clopidogrel Tablet 75 daily  escitalopram 10 daily  heparin   Injectable 5000 every 12 hours  hydrALAZINE 50 daily  lactulose Syrup 10 daily  levothyroxine 88 daily  losartan 100 daily  polyethylene glycol 3350 17 daily  senna 2 at bedtime  simvastatin 20 at bedtime  spironolactone 25 daily      Vital Signs Last 24 Hrs  T(C): 36.9 (11 Mar 2022 07:25), Max: 37.2 (10 Mar 2022 21:50)  T(F): 98.4 (11 Mar 2022 07:25), Max: 98.9 (10 Mar 2022 21:50)  HR: 68 (11 Mar 2022 07:25) (68 - 74)  BP: 132/65 (11 Mar 2022 07:25) (132/65 - 148/56)  BP(mean): --  RR: 18 (11 Mar 2022 07:25) (18 - 18)  SpO2: 98% (11 Mar 2022 07:25) (96% - 99%)    PHYSICAL EXAM:  GENERAL: NAD, elderly, hard of hearing   HEAD:  Atraumatic, Normocephalic  EYES: conjunctiva and sclera clear  CHEST/LUNG: Clear to auscultation bilaterally; No wheeze  HEART: S1S1 present no LE edema  ABDOMEN: Soft, Nontender, Nondistended; Bowel sounds present  EXTREMITIES:  2+ Peripheral Pulses, No edema,   PSYCH: AAOx3  NEUROLOGY: non-focal, RLE can raise extremity, 3/5, LLE 5.5, non-tender to palpation on R hip or sacral area   SKIN: No rashes or lesions                               10.9   8.82  )-----------( 206      ( 11 Mar 2022 07:48 )             34.8       03-10    135  |  100  |  35<H>  ----------------------------<  116<H>  4.8   |  24  |  1.11    Ca    9.7      10 Mar 2022 06:50  Phos  3.1     03-10  Mg     2.70     03-10            MICROBIOLOGY:  v                  RADIOLOGY:   Follow Up:      Interval History/ROS: Patient stating she has some slight pain but otherwise symptoms have improved. She is anxious about going to Rehab.    Allergies  No Known Allergies        ANTIMICROBIALS:      OTHER MEDS:  MEDICATIONS  (STANDING):  acetaminophen     Tablet .. 650 every 6 hours PRN  amLODIPine   Tablet 10 daily  aspirin enteric coated 81 daily  clopidogrel Tablet 75 daily  escitalopram 10 daily  heparin   Injectable 5000 every 12 hours  hydrALAZINE 50 daily  lactulose Syrup 10 daily  levothyroxine 88 daily  losartan 100 daily  polyethylene glycol 3350 17 daily  senna 2 at bedtime  simvastatin 20 at bedtime  spironolactone 25 daily      Vital Signs Last 24 Hrs  T(C): 36.9 (11 Mar 2022 07:25), Max: 37.2 (10 Mar 2022 21:50)  T(F): 98.4 (11 Mar 2022 07:25), Max: 98.9 (10 Mar 2022 21:50)  HR: 68 (11 Mar 2022 07:25) (68 - 74)  BP: 132/65 (11 Mar 2022 07:25) (132/65 - 148/56)  BP(mean): --  RR: 18 (11 Mar 2022 07:25) (18 - 18)  SpO2: 98% (11 Mar 2022 07:25) (96% - 99%)    PHYSICAL EXAM:  GENERAL: NAD, elderly, hard of hearing   HEAD:  Atraumatic, Normocephalic  EYES: conjunctiva and sclera clear  CHEST/LUNG: Clear to auscultation bilaterally; No wheeze  HEART: S1S1 present no LE edema  ABDOMEN: Soft, Nontender, Nondistended; Bowel sounds present  EXTREMITIES:  2+ Peripheral Pulses, No edema,   PSYCH: AAOx3  NEUROLOGY: non-focal, RLE can raise extremity, 3/5, LLE 5.5, non-tender to palpation on R hip or sacral area   SKIN: No rashes or lesions                               10.9   8.82  )-----------( 206      ( 11 Mar 2022 07:48 )             34.8       03-10    135  |  100  |  35<H>  ----------------------------<  116<H>  4.8   |  24  |  1.11    Ca    9.7      10 Mar 2022 06:50  Phos  3.1     03-10  Mg     2.70     03-10            MICROBIOLOGY:  v                  RADIOLOGY:

## 2022-03-11 NOTE — DISCHARGE NOTE NURSING/CASE MANAGEMENT/SOCIAL WORK - NSDCPEFALRISK_GEN_ALL_CORE
For information on Fall & Injury Prevention, visit: [email or web address] OR  [email or web address] OR  https://www.cdc.gov/steadi/patient.html

## 2022-03-11 NOTE — DISCHARGE NOTE NURSING/CASE MANAGEMENT/SOCIAL WORK - PATIENT PORTAL LINK FT
You can access the FollowMyHealth Patient Portal offered by [facility] by registering at the following website: [email or web address]. By joining [employer]’s FollowMyHealth portal, you will also be able to view your health information using other applications (apps) compatible with our system.

## 2022-03-11 NOTE — PROGRESS NOTE ADULT - PROBLEM SELECTOR PLAN 5
Mild indeterminate age compression deformity of T12 as per CT CHEST, ABDOMEN AND PELVIS IC, 2/4/22          -pain control - tylenol and lidocaine patch   -PT eval- [facility]
Mild indeterminate age compression deformity of T12 as per CT CHEST, ABDOMEN AND PELVIS IC, 2/4/22          -pain control  -PT eval
Mild indeterminate age compression deformity of T12 as per CT CHEST, ABDOMEN AND PELVIS IC, 2/4/22          -pain control  -PT eval- [facility]
Mild indeterminate age compression deformity of T12 as per CT CHEST, ABDOMEN AND PELVIS IC, 2/4/22          -pain control - tylenol and lidocaine patch   -PT eval- [facility]

## 2022-03-11 NOTE — PROGRESS NOTE ADULT - PROBLEM SELECTOR PLAN 7
-TSH:1.09  -Continue with home medications  - Continue LTX 88 mcg daily
-TSH:1.09  -Continue with home medications  - Continue LTX 88 mcg daily
-Continue with home medications   -DASH diet  - Amlodipine 10mg daily, Hydralazine 50 mg daily, Losartan 100 mg daily
-TSH:1.09  -Continue with home medications  - Continue LTX 88 mcg daily

## 2022-03-11 NOTE — PROGRESS NOTE ADULT - ATTENDING COMMENTS
Patient seen and examined by me. Case discussed with resident and agree with the resident's findings and plan as documented in the resident's note. 89F h/o hypothyroidism, PVD, HTN, HLD, and history of bilateral femoral stent placements and well as R. carotid stent placement X 2 a/w Rt hip pain with inability to ambulate.     1. Mechanical fall:  -Xrays reviewed -- no evidence of fractures  -CT hip showing right-sided sacral, S3 and pubic rami fractures  -will check Xray of bilateral knees  -will consult orthopedic team for their recs    2. Pain:  -controlled with lidocaine patch and tylenol prn     3. TANNER:  -likely pre-renal  -will encourage PO intake    4. Systolic murmur:  -will check 2D echo    5. Dispo:  -check PT consult
Patient seen and examined by me. Case discussed with resident and agree with the resident's findings and plan as documented in the resident's note. 89F h/o hypothyroidism, PVD, HTN, HLD, and history of bilateral femoral stent placements and well as R. carotid stent placement X 2 a/w Rt hip pain with inability to ambulate.     1. Mechanical fall:  -Xrays reviewed -- no evidence of fractures  -CT hip showing right-sided sacral, S3 and pubic rami fractures  -orthopedic consulted -- no surgical intervention needed; WBAT    2. Pain:  -controlled with lidocaine patch and tylenol prn     3. TANNER:  -likely pre-renal -- improving  -will encourage PO intake    4. Systolic murmur:  -f/u 2D echo    5. Constipation:  -plan for lactulose x1    6. Dispo:  -PT recommending rehab
89 y.o. female w/ hx HTN, high chol, hypothyroidism, PVD, s/p B/L femoral stents, YOLANDA s/p Right carotid artery stent p/w right hip pain found to have right sacral and pubic rami fractures. Seen by ortho and no surgical intervention. Patient's pain is controlled. Awaiting rehab placement. D/C 40 minutes.
Patient seen and examined by me. Case discussed with resident and agree with the resident's findings and plan as documented in the resident's note. 89F h/o hypothyroidism, PVD, HTN, HLD, and history of bilateral femoral stent placements and well as R. carotid stent placement X 2 a/w Rt hip pain with inability to ambulate.     1. Mechanical fall:  -Xrays reviewed -- no evidence of fractures  -CT hip showing right-sided sacral, S3 and pubic rami fractures  -orthopedic consulted -- no surgical intervention needed; WBAT    2. Pain:  -controlled with lidocaine patch and tylenol prn     3. TANNER:  -likely pre-renal -- improving  -will encourage PO intake    4. Systolic murmur:  -f/u 2D echo    5. HTN:   -HCTZ still held given TANNER  -c/w losartan, norvasc, hydralazine and spironolactone daily  -BP well controlled    6. Constipation:  -c/w lactulose daily (consider increasing dose to 20gm if enema doesn't help) and senna daily  -c/w miralax daily  -KUB reviewed - will give tap water enema x1 and monitor    7. Dispo:  -PT recommending rehab  -d/c today/tomorrow

## 2022-03-11 NOTE — PROGRESS NOTE ADULT - PROBLEM SELECTOR PLAN 11
-DVT ppx: Heparin 5000 U SQ q 12 hours   -Ambulate with assistance; WBAT on R extremity   -Elevate head of bed  -Aspiration precautions ordered   -Fall precautions ordered   -DASH diet: regular consistency  - Dispo: [facility] to [facility] Friday 3/11

## 2022-03-11 NOTE — PROGRESS NOTE ADULT - PROBLEM SELECTOR PLAN 3
-BUN: SCr ratio 47; Dry oral MM   -Hold diuretics   -Monitor hydration status  No fluids given in ED  - TANNER improved  - Hold HCTZ upon discharge, can continue all other meds including spironolactone

## 2022-03-11 NOTE — PROGRESS NOTE ADULT - PROBLEM SELECTOR PROBLEM 5
Suspected fracture of vertebra

## 2022-03-11 NOTE — PROGRESS NOTE ADULT - PROBLEM SELECTOR PROBLEM 2
Inability to ambulate due to right hip

## 2022-04-05 NOTE — CONSULT LETTER
[Dear  ___] : Dear  [unfilled], [Consult Letter:] : I had the pleasure of evaluating your patient, [unfilled]. show [Please see my note below.] : Please see my note below. [Consult Closing:] : Thank you very much for allowing me to participate in the care of this patient.  If you have any questions, please do not hesitate to contact me. [Sincerely,] : Sincerely, [FreeTextEntry2] : Dr. Zeeshan Nguyen\par [address] [facility].\[place] [address] [FreeTextEntry3] : Jeramy Diaz D.O.

## 2022-08-19 PROBLEM — E78.5 HYPERLIPIDEMIA, UNSPECIFIED: Chronic | Status: ACTIVE | Noted: 2022-03-07

## 2022-08-19 PROBLEM — Z29.9 ENCOUNTER FOR PROPHYLACTIC MEASURES, UNSPECIFIED: Chronic | Status: ACTIVE | Noted: 2022-03-07

## 2022-08-23 ENCOUNTER — APPOINTMENT (OUTPATIENT)
Dept: ENDOCRINOLOGY | Facility: CLINIC | Age: 87
End: 2022-08-23

## 2022-08-23 VITALS
WEIGHT: 135 LBS | SYSTOLIC BLOOD PRESSURE: 114 MMHG | HEART RATE: 89 BPM | BODY MASS INDEX: 26.5 KG/M2 | RESPIRATION RATE: 18 BRPM | DIASTOLIC BLOOD PRESSURE: 66 MMHG | HEIGHT: 60 IN | OXYGEN SATURATION: 94 % | TEMPERATURE: 97.9 F

## 2022-08-23 DIAGNOSIS — E78.5 HYPERLIPIDEMIA, UNSPECIFIED: ICD-10-CM

## 2022-08-23 PROCEDURE — 99214 OFFICE O/P EST MOD 30 MIN: CPT

## 2022-08-23 RX ORDER — LEVOTHYROXINE SODIUM 0.09 MG/1
88 TABLET ORAL
Qty: 32 | Refills: 5 | Status: COMPLETED | COMMUNITY
Start: 2019-09-12 | End: 2022-08-23

## 2022-08-23 NOTE — HISTORY OF PRESENT ILLNESS
[FreeTextEntry1] : 89 y/o F w/ hx of hypothyroidism dx in 2008 on routine lab work. No hx of thyroid surgery. No hx of radiation to the head or neck. Granddaughters w/ Hashimoto's and Graves'. Synthroid was started and had been on stable dose since diagnosis at 88 mcg daily until March of 2019 TSH 6.68, recommended increase to extra 1/2 tab weekly which she was taking on Mondays. Was chemically euthyroid until 5/2021 with TSH of 6.3 and Free T4 of 1.1. PCP recommended increase to 100 mcg daily which she has been taking since then and has been chemically euthyroid. Takes daily in the morning on empty stomach. Adherent without missed doses. Never on lithium or amiodarone. No palpitations, tremors, appetite good. +weight loss s/p rehab stay +heat intolerance and constipation. +fatigue. Not sleeping well due to restless leg syndrome. \par \par Hx of thyroid cysts. \par Thyroid US performed 7/2020: In the left mid pole there is a  cyst. It measures 1.43 x 1.25 x 0.98 cm. The nodule is round in shape and the border is smooth. The nodule does not have a halo. It demonstrates no calcification. It has no vascularity. \par \par In the left upper pole there is a  cyst. It measures 0.61 x 0.61 x 0.49 cm. The nodule is round in shape and the border is smooth. The nodule does not have a halo. It demonstrates no calcifications. It has no vascularity. \par \par In the left lower pole there is a  mixed, predominantly solid. It measures 1.06 x 0.94 x 1.02 cm. The nodule is round in shape and the border is smooth. The nodule does not have a halo. It demonstrates no calcifications. It has peripheral vascularity. \par \par In the isthmus there is a  cyst. It measures 0.71 x 0.53 x 0.68 cm. The nodule is ovoid in shape and the border is smooth. The nodule does not have a halo. It demonstrates no calcifications. The cyst has "cat eye" artifact. No dysphagia or dysphonia. No hx of FNA.

## 2022-08-23 NOTE — ASSESSMENT
[FreeTextEntry1] : 89 y/o F w/\par \par 1. Hypothyroidism- Clinically euthyroid on exam. Currently on 100 mcg daily. Chemically euthyroid as of 7/2021. Will adjust as needed for goal TSH of 1-5. \par \par 2. Multinodular Goiter- Suspect nodules were incidentally noted on workup for carotid artery stenosis in 2004. Thyroid US performed 3/2019 with right and left sided cysts and nodules (see results section for full description). Can monitor with thyroid US every 1-2 years. Will need to compare US report with 2018 performed at outside radiology facility. Focused thyroid US performed today stable. \par \par 3. HTN- BP at goal, c/w irbesartan and hydralazine\par \par 4. HLD- c/w statin\par \par Prep time with review of labs and interval progress notes and consultations 5 min\par Discussion with patient regarding hypothyroidism with updated changes made by PCP and thyroid nodules with treatment options and goals of care answering all patients questions and addressing all concerns 15 min\par Focused Thyroid US performed at today's visit with discussion with patient and daughter 10 min\par Post-visit completion charting and review 5 min\par Total Time 35 min\par \par Specifically causes, evaluation, treatment options, risks, complications, and benefits of available therapies were discussed. Questions were answered.\par \par The submitted E/M billing level for this visit reflects the total time spent on the day of the visit including face-to-face time spent with the patient, non-face-to-face review of medical records and relevant information, documentation, and asynchronous communication with the patient after a visit via phone, email, or patient’s EHR portal after the visit. \par The medical records reviewed are either scanned into the chart or reviewed with the patient using a patient’s electronic medical records portal for patients with records not available to [facility] via electronic transmission platforms from other institutions and labs. \par Time spend counseling and performing coordination of care was also included in determining the appropriate EM billing level.\par \par I have reviewed and verified information regarding the chief complaint and history recorded by the ancillary staff and/or the patient. I have independently reviewed and interpreted tests performed by other physicians and facilities as necessary. \par \par I have discussed with the patient differential diagnosis, reason for auxiliary tests if ordered, risks, benefits, alternatives, and complications of each form of therapy were discussed.\par

## 2022-08-23 NOTE — DATA REVIEWED
[FreeTextEntry1] : Focused Thyroid US 8/23/2022: In the right mid pole there is a  cyst. It measures 1.2 x 1.1 x 0.8 cm. The nodule is round in shape and the border is smooth. The nodule does not have a halo. It demonstrates no calcification. It has no vascularity. \par Additional subcentimeter nodules and cysts b/l unchanged. \par \par Focused Thyroid US 8/17/2021: In the right mid pole there is a  cyst. It measures 1.4 x 1.0 x 0.86 cm. The nodule is round in shape and the border is smooth. The nodule does not have a halo. It demonstrates no calcification. It has no vascularity. \par Additional subcentimeter nodules b/l unchanged. \par \par Labs 7/2021:\par TSH 3\par \par Labs 5/2021:\par TSH 6.3\par \par Labs 9/2019:\par TSH 1.50\par Free T4 1.4\par \par Labs 3/2019:\par TSH 6.68\par Free T4 1.3\par Vit D 48.1\par \par Thyroid Ultrasound Report 3/5/19:\par \par Technique: multiple real time longitudinal and transverse images were obtained using a high resolution ultrasound with a linear transducer, [employer] e 2008 model, 10-12 MHz frequencies. All measurements will be reported as longitudinal x sue-posterior x transverse. \par Comparison: None. \par \par Indication: multinodular goiter. \par \par Findings: \par The right thyroid lobe measures 4.21 x 1.17 x 1.82 cm. The left thyroid lobe measures 5.12 x 1.75 x 1.77 cm. The isthmus measures 0.20 cm. \par The right thyroid lobe has a heterogeneous parenchyma. \par The left thyroid lobe has a heterogeneous parenchyma . \par  \par In the left mid pole there is a  cyst. It measures 1.43 x 1.25 x 0.98 cm. The nodule is round in shape and the border is smooth. The nodule does not have a halo. It demonstrates no calcification. It has no vascularity. \par \par In the left upper pole there is a  cyst. It measures 0.61 x 0.61 x 0.49 cm. The nodule is round in shape and the border is smooth. The nodule does not have a halo. It demonstrates no calcifications. It has no vascularity. \par \par In the left lower pole there is a  mixed, predominantly solid. It measures 1.06 x 0.94 x 1.02 cm. The nodule is round in shape and the border is smooth. The nodule does not have a halo. It demonstrates no calcifications. It has peripheral vascularity. \par \par In the isthmus there is a  cyst. It measures 0.71 x 0.53 x 0.68 cm. The nodule is ovoid in shape and the border is smooth. The nodule does not have a halo. It demonstrates no calcifications. The cyst has "cat eye" artifact. \par \par Labs 9/2017:\par Cr 1.36\par Glucose 04

## 2022-08-23 NOTE — PROCEDURE
[[employer] e 2008 model, 10-12 MHz frequencies] : multiple real time longitudinal and transverse images were obtained using a high resolution ultrasound with a linear transducer, [employer] e 2008 model, 10-12 MHz frequencies. All measurements will be reported as longitudinal x sue-posterior x transverse. [None] : None [Multinodular Goiter] : multinodular goiter [] : a heterogeneous parenchyma  [Mid] : mid pole there is a  [No] : does not have a halo [No calcification] : no calcification [Upper] : upper pole there is a  [No vascularity] : no vascularity [Left Thyroid] : left [Lower] : lower pole there is a  [Mixed] : mixed [Predominantly Solid] : predominantly solid [Round] : round in shape [Peripheral vascularity] : peripheral vascularity [Isthmus] : isthmus there is a  [Cyst] : cyst [Ovoid] : ovoid in shape [Smooth] : smooth [No calcifications] : no calcifications ["Cat eye" artifact] : "cat eye" artifact [FreeTextEntry1] : 4.21 x 1.17 x 1.82 [FreeTextEntry5] : 5.12 x 1.75 x 1.77 [FreeTextEntry2] : 0.20 [FreeTextEntry3] : 0.71 x 0.53 x 0.68

## 2022-08-23 NOTE — PHYSICAL EXAM
[Alert] : alert [Well Nourished] : well nourished [Healthy Appearance] : healthy appearance [No Acute Distress] : no acute distress [Well Developed] : well developed [EOMI] : extra ocular movement intact [No Proptosis] : no proptosis [No Respiratory Distress] : no respiratory distress [No Accessory Muscle Use] : no accessory muscle use [Normal Rate and Effort] : normal respiratory rate and effort [Clear to Auscultation] : lungs were clear to auscultation bilaterally [Normal S1, S2] : normal S1 and S2 [Normal Rate] : heart rate was normal [Regular Rhythm] : with a regular rhythm [Soft] : abdomen soft [No Stigmata of Cushings Syndrome] : no stigmata of Cushings Syndrome [Oriented x3] : oriented to person, place, and time [Normal Affect] : the affect was normal [Normal Mood] : the mood was normal [de-identified] : +Heterogenous gland [de-identified] : +LE edema left > right mostly in the ankle

## 2022-08-23 NOTE — REVIEW OF SYSTEMS
[Fatigue] : fatigue [Recent Weight Loss (___ Lbs)] : recent weight loss: [unfilled] lbs [Lower Ext Edema] : lower extremity edema [All other systems negative] : All other systems negative [Recent Weight Gain (___ Lbs)] : no recent weight gain [Visual Field Defect] : no visual field defect [Dysphagia] : no dysphagia [Dysphonia] : no dysphonia [Chest Pain] : no chest pain [Palpitations] : no palpitations [Shortness Of Breath] : no shortness of breath [Nausea] : no nausea [Vomiting] : no vomiting [Polyuria] : no polyuria [Joint Pain] : no joint pain [Headaches] : no headaches [Tremors] : no tremors [Polydipsia] : no polydipsia

## 2022-08-24 LAB
25(OH)D3 SERPL-MCNC: 66.2 NG/ML
ALBUMIN SERPL ELPH-MCNC: 4.3 G/DL
ALP BLD-CCNC: 73 U/L
ALT SERPL-CCNC: 15 U/L
ANION GAP SERPL CALC-SCNC: 8 MMOL/L
AST SERPL-CCNC: 19 U/L
BASOPHILS # BLD AUTO: 0.11 K/UL
BASOPHILS NFR BLD AUTO: 1.4 %
BILIRUB SERPL-MCNC: 0.3 MG/DL
BUN SERPL-MCNC: 49 MG/DL
CALCIUM SERPL-MCNC: 9.9 MG/DL
CHLORIDE SERPL-SCNC: 104 MMOL/L
CHOLEST SERPL-MCNC: 186 MG/DL
CO2 SERPL-SCNC: 28 MMOL/L
CREAT SERPL-MCNC: 1.41 MG/DL
EGFR: 35 ML/MIN/1.73M2
EOSINOPHIL # BLD AUTO: 0.18 K/UL
EOSINOPHIL NFR BLD AUTO: 2.3 %
ESTIMATED AVERAGE GLUCOSE: 114 MG/DL
GLUCOSE SERPL-MCNC: 107 MG/DL
HBA1C MFR BLD HPLC: 5.6 %
HCT VFR BLD CALC: 36.4 %
HDLC SERPL-MCNC: 69 MG/DL
HGB BLD-MCNC: 11.3 G/DL
IMM GRANULOCYTES NFR BLD AUTO: 0.1 %
LDLC SERPL CALC-MCNC: 91 MG/DL
LYMPHOCYTES # BLD AUTO: 2.19 K/UL
LYMPHOCYTES NFR BLD AUTO: 28 %
MAN DIFF?: NORMAL
MCHC RBC-ENTMCNC: 31 GM/DL
MCHC RBC-ENTMCNC: 31 PG
MCV RBC AUTO: 100 FL
MONOCYTES # BLD AUTO: 0.72 K/UL
MONOCYTES NFR BLD AUTO: 9.2 %
NEUTROPHILS # BLD AUTO: 4.62 K/UL
NEUTROPHILS NFR BLD AUTO: 59 %
NONHDLC SERPL-MCNC: 117 MG/DL
PLATELET # BLD AUTO: 237 K/UL
POTASSIUM SERPL-SCNC: 5.8 MMOL/L
PROT SERPL-MCNC: 6.2 G/DL
RBC # BLD: 3.64 M/UL
RBC # FLD: 13.4 %
SODIUM SERPL-SCNC: 141 MMOL/L
T4 FREE SERPL-MCNC: 1 NG/DL
TRIGL SERPL-MCNC: 127 MG/DL
TSH SERPL-ACNC: 5.62 UIU/ML
WBC # FLD AUTO: 7.83 K/UL

## 2022-08-24 RX ORDER — MELOXICAM 7.5 MG/1
7.5 TABLET ORAL
Qty: 10 | Refills: 0 | Status: DISCONTINUED | COMMUNITY
Start: 2022-02-22

## 2022-08-24 RX ORDER — CYCLOBENZAPRINE HYDROCHLORIDE 10 MG/1
10 TABLET, FILM COATED ORAL
Qty: 30 | Refills: 0 | Status: DISCONTINUED | COMMUNITY
Start: 2022-02-25

## 2022-08-24 RX ORDER — IPRATROPIUM BROMIDE 21 UG/1
0.03 SPRAY NASAL
Qty: 30 | Refills: 0 | Status: DISCONTINUED | COMMUNITY
Start: 2022-05-19

## 2022-08-24 RX ORDER — SPIRONOLACTONE 25 MG/1
25 TABLET ORAL
Qty: 30 | Refills: 0 | Status: DISCONTINUED | COMMUNITY
Start: 2022-04-08

## 2022-08-24 RX ORDER — METHYLPREDNISOLONE 4 MG/1
4 TABLET ORAL
Qty: 21 | Refills: 0 | Status: DISCONTINUED | COMMUNITY
Start: 2022-02-25

## 2022-08-24 RX ORDER — LACTULOSE 10 G/15ML
10 SOLUTION ORAL
Qty: 150 | Refills: 0 | Status: DISCONTINUED | COMMUNITY
Start: 2022-04-18

## 2022-08-24 RX ORDER — FLUTICASONE PROPIONATE 50 UG/1
50 SPRAY, METERED NASAL
Qty: 16 | Refills: 0 | Status: DISCONTINUED | COMMUNITY
Start: 2022-05-19

## 2023-05-17 NOTE — H&P ADULT - NSHPPHYSICALEXAM_GEN_ALL_CORE
Pt advised and dismissed in no acute distress. Pt verbalized understanding of d/c instructions and follow up.   
Vital Signs Last 24 Hrs  T(C): 36.8 (07 Mar 2022 20:58), Max: 37 (07 Mar 2022 13:25)  T(F): 98.2 (07 Mar 2022 20:58), Max: 98.6 (07 Mar 2022 13:25)  HR: 69 (07 Mar 2022 20:58) (69 - 89)  BP: 124/54 (07 Mar 2022 20:58) (124/54 - 152/58)  BP(mean): --  RR: 20 (07 Mar 2022 20:58) (18 - 20)  SpO2: 97% (07 Mar 2022 20:58) (97% - 98%)

## 2023-08-23 ENCOUNTER — APPOINTMENT (OUTPATIENT)
Dept: ENDOCRINOLOGY | Facility: CLINIC | Age: 88
End: 2023-08-23

## 2023-08-23 ENCOUNTER — APPOINTMENT (OUTPATIENT)
Dept: ENDOCRINOLOGY | Facility: CLINIC | Age: 88
End: 2023-08-23
Payer: MEDICARE

## 2023-08-23 VITALS
HEIGHT: 60 IN | HEART RATE: 82 BPM | SYSTOLIC BLOOD PRESSURE: 135 MMHG | TEMPERATURE: 98.6 F | DIASTOLIC BLOOD PRESSURE: 63 MMHG | OXYGEN SATURATION: 95 % | BODY MASS INDEX: 26.9 KG/M2 | WEIGHT: 137 LBS

## 2023-08-23 DIAGNOSIS — E03.9 HYPOTHYROIDISM, UNSPECIFIED: ICD-10-CM

## 2023-08-23 DIAGNOSIS — I10 ESSENTIAL (PRIMARY) HYPERTENSION: ICD-10-CM

## 2023-08-23 DIAGNOSIS — E04.2 NONTOXIC MULTINODULAR GOITER: ICD-10-CM

## 2023-08-23 PROCEDURE — 99214 OFFICE O/P EST MOD 30 MIN: CPT

## 2023-08-23 NOTE — REVIEW OF SYSTEMS
[Fatigue] : fatigue [Recent Weight Gain (___ Lbs)] : no recent weight gain [Recent Weight Loss (___ Lbs)] : no recent weight loss [Visual Field Defect] : no visual field defect [Dysphagia] : no dysphagia [Dysphonia] : no dysphonia [Chest Pain] : no chest pain [Palpitations] : no palpitations [Lower Ext Edema] : lower extremity edema [Shortness Of Breath] : no shortness of breath [Nausea] : no nausea [Vomiting] : no vomiting [Polyuria] : no polyuria [Joint Pain] : no joint pain [Headaches] : no headaches [Tremors] : no tremors [Polydipsia] : no polydipsia [All other systems negative] : All other systems negative

## 2023-08-23 NOTE — REASON FOR VISIT
[Follow - Up] : a follow-up visit [Family Member] : family member [Formal Caregiver] : formal caregiver

## 2023-08-23 NOTE — DATA REVIEWED
[FreeTextEntry1] : Labs 8/2022: TSH 5.62 CMP normal except Cr 1.41 Glucose 107 A1c 5.6% Vit D 66.2  Focused Thyroid US 8/23/2022: In the right mid pole there is a  cyst. It measures 1.2 x 1.1 x 0.8 cm. The nodule is round in shape and the border is smooth. The nodule does not have a halo. It demonstrates no calcification. It has no vascularity.  Additional subcentimeter nodules and cysts b/l unchanged.   Focused Thyroid US 8/17/2021: In the right mid pole there is a  cyst. It measures 1.4 x 1.0 x 0.86 cm. The nodule is round in shape and the border is smooth. The nodule does not have a halo. It demonstrates no calcification. It has no vascularity.  Additional subcentimeter nodules b/l unchanged.   Labs 7/2021: TSH 3  Labs 5/2021: TSH 6.3  Labs 9/2019: TSH 1.50 Free T4 1.4  Labs 3/2019: TSH 6.68 Free T4 1.3 Vit D 48.1  Thyroid Ultrasound Report 3/5/19:  Technique: multiple real time longitudinal and transverse images were obtained using a high resolution ultrasound with a linear transducer, [employer] e 2008 model, 10-12 MHz frequencies. All measurements will be reported as longitudinal x sue-posterior x transverse.  Comparison: None.   Indication: multinodular goiter.   Findings:  The right thyroid lobe measures 4.21 x 1.17 x 1.82 cm. The left thyroid lobe measures 5.12 x 1.75 x 1.77 cm. The isthmus measures 0.20 cm.  The right thyroid lobe has a heterogeneous parenchyma.  The left thyroid lobe has a heterogeneous parenchyma .    In the left mid pole there is a  cyst. It measures 1.43 x 1.25 x 0.98 cm. The nodule is round in shape and the border is smooth. The nodule does not have a halo. It demonstrates no calcification. It has no vascularity.   In the left upper pole there is a  cyst. It measures 0.61 x 0.61 x 0.49 cm. The nodule is round in shape and the border is smooth. The nodule does not have a halo. It demonstrates no calcifications. It has no vascularity.   In the left lower pole there is a  mixed, predominantly solid. It measures 1.06 x 0.94 x 1.02 cm. The nodule is round in shape and the border is smooth. The nodule does not have a halo. It demonstrates no calcifications. It has peripheral vascularity.   In the isthmus there is a  cyst. It measures 0.71 x 0.53 x 0.68 cm. The nodule is ovoid in shape and the border is smooth. The nodule does not have a halo. It demonstrates no calcifications. The cyst has "cat eye" artifact.   Labs 9/2017: Cr 1.36 Glucose 04

## 2023-08-23 NOTE — HISTORY OF PRESENT ILLNESS
[FreeTextEntry1] : 89 y/o F w/ hx of hypothyroidism dx in 2008 on routine lab work. No hx of thyroid surgery. No hx of radiation to the head or neck. Granddaughters w/ Hashimoto's and Graves'. Synthroid was started and had been on stable dose since diagnosis at 88 mcg daily until March of 2019 TSH 6.68, recommended increase to extra 1/2 tab weekly which she was taking on Mondays. Was chemically euthyroid until 5/2021 with TSH of 6.3 and Free T4 of 1.1. PCP recommended increase to 100 mcg daily. Increased to 112 mcg 8/2022 for TSH above goal with worsening constipation. Has been taking since then. Takes daily in the morning on empty stomach. Adherent without missed doses. Never on lithium or amiodarone. No palpitations, tremors, appetite good. +weight loss s/p rehab stay now stable. +heat intolerance and constipation. +fatigue. Not sleeping well due to restless leg syndrome.   Hx of thyroid cysts.  Thyroid US performed 7/2020: In the left mid pole there is a  cyst. It measures 1.43 x 1.25 x 0.98 cm. The nodule is round in shape and the border is smooth. The nodule does not have a halo. It demonstrates no calcification. It has no vascularity.   In the left upper pole there is a  cyst. It measures 0.61 x 0.61 x 0.49 cm. The nodule is round in shape and the border is smooth. The nodule does not have a halo. It demonstrates no calcifications. It has no vascularity.   In the left lower pole there is a  mixed, predominantly solid. It measures 1.06 x 0.94 x 1.02 cm. The nodule is round in shape and the border is smooth. The nodule does not have a halo. It demonstrates no calcifications. It has peripheral vascularity.   In the isthmus there is a  cyst. It measures 0.71 x 0.53 x 0.68 cm. The nodule is ovoid in shape and the border is smooth. The nodule does not have a halo. It demonstrates no calcifications. The cyst has "cat eye" artifact. No dysphagia or dysphonia. No hx of FNA.

## 2023-08-23 NOTE — PROCEDURE
[[employer] e 2008 model, 10-12 MHz frequencies] : multiple real time longitudinal and transverse images were obtained using a high resolution ultrasound with a linear transducer, [employer] e 2008 model, 10-12 MHz frequencies. All measurements will be reported as longitudinal x sue-posterior x transverse. [None] : None [Multinodular Goiter] : multinodular goiter [] : a heterogeneous parenchyma  [Mid] : mid pole there is a  [No] : does not have a halo [No calcification] : no calcification [Upper] : upper pole there is a  [No vascularity] : no vascularity [Left Thyroid] : left [Lower] : lower pole there is a  [Mixed] : mixed [Predominantly Solid] : predominantly solid [Round] : round in shape [Peripheral vascularity] : peripheral vascularity [Isthmus] : isthmus there is a  [Cyst] : cyst [Ovoid] : ovoid in shape [Smooth] : smooth [No] : does not have a halo [No calcifications] : no calcifications ["Cat eye" artifact] : "cat eye" artifact [FreeTextEntry1] : 4.21 x 1.17 x 1.82 [FreeTextEntry5] : 5.12 x 1.75 x 1.77 [FreeTextEntry2] : 0.20 [FreeTextEntry3] : 0.71 x 0.53 x 0.68

## 2023-08-23 NOTE — ASSESSMENT
[FreeTextEntry1] : 89 y/o F w/  1. Hypothyroidism- Clinically euthyroid on exam. Currently on 112 mcg daily. Will adjust as needed for goal TSH of 1-5.   2. Multinodular Goiter- Suspect nodules were incidentally noted on workup for carotid artery stenosis in 2004. Thyroid US performed 3/2019 with right and left sided cysts and nodules (see results section for full description). Can monitor with thyroid US every 2 years. Will need to compare US report with 2018 performed at outside radiology facility. Focused thyroid US performed 8/2022 stable.   3. HTN- BP at goal, c/w irbesartan and hydralazine  4. HLD- c/w statin  Prep time with review of labs and interval progress notes and consultations  Discussion with patient regarding hypothyroidism with updated changes made by PCP and thyroid nodules with treatment options and goals of care answering all patients questions and addressing all concerns  Post-visit completion charting and review  Total Time 30 min  Specifically causes, evaluation, treatment options, risks, complications, and benefits of available therapies were discussed. Questions were answered.  The submitted E/M billing level for this visit reflects the total time spent on the day of the visit including face-to-face time spent with the patient, non-face-to-face review of medical records and relevant information, documentation, and asynchronous communication with the patient after a visit via phone, email, or patient's EHR portal after the visit.  The medical records reviewed are either scanned into the chart or reviewed with the patient using a patient's electronic medical records portal for patients with records not available to [facility] via electronic transmission platforms from other institutions and labs.  Time spend counseling and performing coordination of care was also included in determining the appropriate EM billing level.  I have reviewed and verified information regarding the chief complaint and history recorded by the ancillary staff and/or the patient. I have independently reviewed and interpreted tests performed by other physicians and facilities as necessary.   I have discussed with the patient differential diagnosis, reason for auxiliary tests if ordered, risks, benefits, alternatives, and complications of each form of therapy were discussed.

## 2023-08-23 NOTE — PHYSICAL EXAM
[Alert] : alert [Well Nourished] : well nourished [Healthy Appearance] : healthy appearance [No Acute Distress] : no acute distress [Well Developed] : well developed [EOMI] : extra ocular movement intact [No Proptosis] : no proptosis [No Respiratory Distress] : no respiratory distress [No Accessory Muscle Use] : no accessory muscle use [Normal Rate and Effort] : normal respiratory rate and effort [Clear to Auscultation] : lungs were clear to auscultation bilaterally [Normal S1, S2] : normal S1 and S2 [Normal Rate] : heart rate was normal [Regular Rhythm] : with a regular rhythm [Soft] : abdomen soft [No Stigmata of Cushings Syndrome] : no stigmata of Cushings Syndrome [Oriented x3] : oriented to person, place, and time [Normal Affect] : the affect was normal [Normal Mood] : the mood was normal [de-identified] : +Heterogenous gland [de-identified] : +LE edema left > right mostly in the ankle

## 2024-02-09 NOTE — OCCUPATIONAL THERAPY INITIAL EVALUATION ADULT - LIGHT TOUCH SENSATION, LUE, REHAB EVAL
darkened lights/plan of care explained/side rails up/wait time explained/warm blanket provided
within normal limits

## 2024-02-27 ENCOUNTER — RX RENEWAL (OUTPATIENT)
Age: 89
End: 2024-02-27

## 2024-02-27 RX ORDER — LEVOTHYROXINE SODIUM 0.11 MG/1
112 TABLET ORAL
Qty: 90 | Refills: 3 | Status: ACTIVE | COMMUNITY
Start: 1900-01-01 | End: 1900-01-01

## 2024-08-12 ENCOUNTER — EMERGENCY (EMERGENCY)
Facility: HOSPITAL | Age: 89
LOS: 1 days | Discharge: HOME | End: 2024-08-12
Attending: STUDENT IN AN ORGANIZED HEALTH CARE EDUCATION/TRAINING PROGRAM
Payer: MEDICARE

## 2024-08-12 VITALS
WEIGHT: 139.99 LBS | SYSTOLIC BLOOD PRESSURE: 138 MMHG | OXYGEN SATURATION: 96 % | TEMPERATURE: 98 F | HEART RATE: 86 BPM | DIASTOLIC BLOOD PRESSURE: 73 MMHG | RESPIRATION RATE: 19 BRPM

## 2024-08-12 DIAGNOSIS — Z98.62 PERIPHERAL VASCULAR ANGIOPLASTY STATUS: Chronic | ICD-10-CM

## 2024-08-12 DIAGNOSIS — Z95.0 PRESENCE OF CARDIAC PACEMAKER: Chronic | ICD-10-CM

## 2024-08-12 PROCEDURE — 99285 EMERGENCY DEPT VISIT HI MDM: CPT

## 2024-08-12 NOTE — ED ADULT TRIAGE NOTE - BANDS:
morphine (MS CONTIN) 15 MG CR tablet [Mohan Bush, DO]       Patient Comment: I only recieved half or 30 tabs, last time.           Last Written Prescription Date:  12/28/20  Last Fill Quantity: 30,   # refills: 0  Last Office Visit: 8/19/20  Future Office visit:       Routing refill request to provider for review/approval because:  Drug not on the [facility], P or [facility] refill protocol or controlled substance    
Fall Risk;

## 2024-08-13 VITALS
RESPIRATION RATE: 18 BRPM | TEMPERATURE: 98 F | DIASTOLIC BLOOD PRESSURE: 69 MMHG | HEART RATE: 60 BPM | SYSTOLIC BLOOD PRESSURE: 162 MMHG | OXYGEN SATURATION: 95 %

## 2024-08-13 LAB
ALBUMIN SERPL ELPH-MCNC: 4 G/DL — SIGNIFICANT CHANGE UP (ref 3.3–5)
ALP SERPL-CCNC: 53 U/L — SIGNIFICANT CHANGE UP (ref 40–120)
ALT FLD-CCNC: 17 U/L — SIGNIFICANT CHANGE UP (ref 10–45)
ANION GAP SERPL CALC-SCNC: 13 MMOL/L — SIGNIFICANT CHANGE UP (ref 5–17)
AST SERPL-CCNC: 20 U/L — SIGNIFICANT CHANGE UP (ref 10–40)
BASE EXCESS BLDV CALC-SCNC: 1.9 MMOL/L — SIGNIFICANT CHANGE UP (ref -2–3)
BASOPHILS # BLD AUTO: 0.08 K/UL — SIGNIFICANT CHANGE UP (ref 0–0.2)
BASOPHILS NFR BLD AUTO: 0.9 % — SIGNIFICANT CHANGE UP (ref 0–2)
BILIRUB SERPL-MCNC: 0.2 MG/DL — SIGNIFICANT CHANGE UP (ref 0.2–1.2)
BUN SERPL-MCNC: 51 MG/DL — HIGH (ref 7–23)
CA-I SERPL-SCNC: 1.33 MMOL/L — SIGNIFICANT CHANGE UP (ref 1.15–1.33)
CALCIUM SERPL-MCNC: 10 MG/DL — SIGNIFICANT CHANGE UP (ref 8.4–10.5)
CHLORIDE BLDV-SCNC: 102 MMOL/L — SIGNIFICANT CHANGE UP (ref 96–108)
CHLORIDE SERPL-SCNC: 101 MMOL/L — SIGNIFICANT CHANGE UP (ref 96–108)
CK MB CFR SERPL CALC: 3 NG/ML — SIGNIFICANT CHANGE UP (ref 0–3.8)
CK SERPL-CCNC: 66 U/L — SIGNIFICANT CHANGE UP (ref 25–170)
CO2 BLDV-SCNC: 31 MMOL/L — HIGH (ref 22–26)
CO2 SERPL-SCNC: 23 MMOL/L — SIGNIFICANT CHANGE UP (ref 22–31)
CREAT SERPL-MCNC: 1.24 MG/DL — SIGNIFICANT CHANGE UP (ref 0.5–1.3)
EGFR: 40 ML/MIN/1.73M2 — LOW
EOSINOPHIL # BLD AUTO: 0.18 K/UL — SIGNIFICANT CHANGE UP (ref 0–0.5)
EOSINOPHIL NFR BLD AUTO: 2.1 % — SIGNIFICANT CHANGE UP (ref 0–6)
GAS PNL BLDV: 131 MMOL/L — LOW (ref 136–145)
GAS PNL BLDV: SIGNIFICANT CHANGE UP
GAS PNL BLDV: SIGNIFICANT CHANGE UP
GLUCOSE BLDV-MCNC: 131 MG/DL — HIGH (ref 70–99)
GLUCOSE SERPL-MCNC: 134 MG/DL — HIGH (ref 70–99)
HCO3 BLDV-SCNC: 29 MMOL/L — SIGNIFICANT CHANGE UP (ref 22–29)
HCT VFR BLD CALC: 33.1 % — LOW (ref 34.5–45)
HCT VFR BLDA CALC: 32 % — LOW (ref 34.5–46.5)
HGB BLD CALC-MCNC: 10.7 G/DL — LOW (ref 11.7–16.1)
HGB BLD-MCNC: 10.5 G/DL — LOW (ref 11.5–15.5)
IMM GRANULOCYTES NFR BLD AUTO: 0.1 % — SIGNIFICANT CHANGE UP (ref 0–0.9)
LACTATE BLDV-MCNC: 1.8 MMOL/L — SIGNIFICANT CHANGE UP (ref 0.5–2)
LYMPHOCYTES # BLD AUTO: 1.45 K/UL — SIGNIFICANT CHANGE UP (ref 1–3.3)
LYMPHOCYTES # BLD AUTO: 16.6 % — SIGNIFICANT CHANGE UP (ref 13–44)
MAGNESIUM SERPL-MCNC: 2.9 MG/DL — HIGH (ref 1.6–2.6)
MCHC RBC-ENTMCNC: 30.3 PG — SIGNIFICANT CHANGE UP (ref 27–34)
MCHC RBC-ENTMCNC: 31.7 GM/DL — LOW (ref 32–36)
MCV RBC AUTO: 95.7 FL — SIGNIFICANT CHANGE UP (ref 80–100)
MONOCYTES # BLD AUTO: 0.86 K/UL — SIGNIFICANT CHANGE UP (ref 0–0.9)
MONOCYTES NFR BLD AUTO: 9.9 % — SIGNIFICANT CHANGE UP (ref 2–14)
NEUTROPHILS # BLD AUTO: 6.15 K/UL — SIGNIFICANT CHANGE UP (ref 1.8–7.4)
NEUTROPHILS NFR BLD AUTO: 70.4 % — SIGNIFICANT CHANGE UP (ref 43–77)
NRBC # BLD: 0 /100 WBCS — SIGNIFICANT CHANGE UP (ref 0–0)
NT-PROBNP SERPL-SCNC: 1037 PG/ML — HIGH (ref 0–300)
PCO2 BLDV: 56 MMHG — HIGH (ref 39–42)
PH BLDV: 7.32 — SIGNIFICANT CHANGE UP (ref 7.32–7.43)
PLATELET # BLD AUTO: 212 K/UL — SIGNIFICANT CHANGE UP (ref 150–400)
PO2 BLDV: 41 MMHG — SIGNIFICANT CHANGE UP (ref 25–45)
POTASSIUM BLDV-SCNC: 4.2 MMOL/L — SIGNIFICANT CHANGE UP (ref 3.5–5.1)
POTASSIUM SERPL-MCNC: 4.3 MMOL/L — SIGNIFICANT CHANGE UP (ref 3.5–5.3)
POTASSIUM SERPL-SCNC: 4.3 MMOL/L — SIGNIFICANT CHANGE UP (ref 3.5–5.3)
PROT SERPL-MCNC: 6.3 G/DL — SIGNIFICANT CHANGE UP (ref 6–8.3)
RBC # BLD: 3.46 M/UL — LOW (ref 3.8–5.2)
RBC # FLD: 13.3 % — SIGNIFICANT CHANGE UP (ref 10.3–14.5)
SAO2 % BLDV: 70 % — SIGNIFICANT CHANGE UP (ref 67–88)
SODIUM SERPL-SCNC: 137 MMOL/L — SIGNIFICANT CHANGE UP (ref 135–145)
TROPONIN T, HIGH SENSITIVITY RESULT: 31 NG/L — SIGNIFICANT CHANGE UP (ref 0–51)
TROPONIN T, HIGH SENSITIVITY RESULT: 35 NG/L — SIGNIFICANT CHANGE UP (ref 0–51)
WBC # BLD: 8.73 K/UL — SIGNIFICANT CHANGE UP (ref 3.8–10.5)
WBC # FLD AUTO: 8.73 K/UL — SIGNIFICANT CHANGE UP (ref 3.8–10.5)

## 2024-08-13 PROCEDURE — 84295 ASSAY OF SERUM SODIUM: CPT

## 2024-08-13 PROCEDURE — 85014 HEMATOCRIT: CPT

## 2024-08-13 PROCEDURE — 82947 ASSAY GLUCOSE BLOOD QUANT: CPT

## 2024-08-13 PROCEDURE — 71045 X-RAY EXAM CHEST 1 VIEW: CPT

## 2024-08-13 PROCEDURE — 82435 ASSAY OF BLOOD CHLORIDE: CPT

## 2024-08-13 PROCEDURE — 70450 CT HEAD/BRAIN W/O DYE: CPT | Mod: 26,MC

## 2024-08-13 PROCEDURE — 71045 X-RAY EXAM CHEST 1 VIEW: CPT | Mod: 26

## 2024-08-13 PROCEDURE — 85018 HEMOGLOBIN: CPT

## 2024-08-13 PROCEDURE — 72170 X-RAY EXAM OF PELVIS: CPT

## 2024-08-13 PROCEDURE — 83735 ASSAY OF MAGNESIUM: CPT

## 2024-08-13 PROCEDURE — 83605 ASSAY OF LACTIC ACID: CPT

## 2024-08-13 PROCEDURE — 82803 BLOOD GASES ANY COMBINATION: CPT

## 2024-08-13 PROCEDURE — 84484 ASSAY OF TROPONIN QUANT: CPT

## 2024-08-13 PROCEDURE — 84132 ASSAY OF SERUM POTASSIUM: CPT

## 2024-08-13 PROCEDURE — 82553 CREATINE MB FRACTION: CPT

## 2024-08-13 PROCEDURE — 99285 EMERGENCY DEPT VISIT HI MDM: CPT | Mod: 25

## 2024-08-13 PROCEDURE — 70450 CT HEAD/BRAIN W/O DYE: CPT | Mod: MC

## 2024-08-13 PROCEDURE — 93005 ELECTROCARDIOGRAM TRACING: CPT

## 2024-08-13 PROCEDURE — 82330 ASSAY OF CALCIUM: CPT

## 2024-08-13 PROCEDURE — 80053 COMPREHEN METABOLIC PANEL: CPT

## 2024-08-13 PROCEDURE — 83880 ASSAY OF NATRIURETIC PEPTIDE: CPT

## 2024-08-13 PROCEDURE — 72170 X-RAY EXAM OF PELVIS: CPT | Mod: 26

## 2024-08-13 PROCEDURE — 82550 ASSAY OF CK (CPK): CPT

## 2024-08-13 PROCEDURE — 36000 PLACE NEEDLE IN VEIN: CPT

## 2024-08-13 PROCEDURE — 85025 COMPLETE CBC W/AUTO DIFF WBC: CPT

## 2024-08-13 NOTE — ED PROVIDER NOTE - NSFOLLOWUPINSTRUCTIONS_ED_ALL_ED_FT
YOU WERE SEEN IN THE ED FOR: leg weakness    CONTINUE YOUR HOME MEDICATIONS.    OUR PHYSICAL EXAM FOUND A HEART MURMUR. PLEASE DISCUSS WITH YOUR CARDIOLOGIST.    PLEASE FOLLOW UP WITH YOUR PRIVATE PHYSICIAN WITHIN THE NEXT 48 HOURS. BRING COPIES OF YOUR RESULTS.    RETURN TO THE EMERGENCY DEPARTMENT IF YOU EXPERIENCE ANY NEW/CONCERNING/WORSENING SYMPTOMS.

## 2024-08-13 NOTE — ED PROVIDER NOTE - NSICDXPASTSURGICALHX_GEN_ALL_CORE_FT
PAST SURGICAL HISTORY:  Artificial pacemaker 2016 [facility], [facility]    H/O angioplasty Bilateral femoral stents; R. carotid artery stents

## 2024-08-13 NOTE — ED PROVIDER NOTE - PATIENT PORTAL LINK FT
You can access the FollowMyHealth Patient Portal offered by [facility] by registering at the following website: [email or web address]. By joining "[employer]’s FollowMyHealth portal, you will also be able to view your health information using other applications (apps) compatible with our system.

## 2024-08-13 NOTE — ED PROVIDER NOTE - CLINICAL SUMMARY MEDICAL DECISION MAKING FREE TEXT BOX
Attending Amol Nicole:  89 female with a history of peripheral vascular disease, hypertension, pacemaker, hypothyroidism, high cholesterol on aspirin and clopidogrel, pacemaker here after unwitnessed but mechanical fall.  Patient states she was walking with her walker which is her baseline, states her legs gave out on her and she fell onto her butt.  Patient unable to stand at the time EMS was called.  Denies head strike, head trauma.  Denies any seizure-like activity, any LOC.  Denies any preceding or post eating chest pain, shortness of breath, diaphoresis.  Usual state of health prior to this.  Denies any infection symptoms, bleeding anywhere.  Still tolerating p.o., having bowel movements, urinating normally without any can discomfort or pain.    Hemodynamically stable.  No acute distress awake alert oriented x 3, no drift.  CN II to XII intact.  No audible carotid bruit.  Steady unassisted gait with 1 person.  Regular rate and rhythm though audible systolic murmur.  Clear lungs no increased work of breathing.  No signs of clinical anemia or fluid overload.  No rash.  Full range of motion bilateral lower extremities and upper extremities bilaterally.  Neurovasc intact with 2+ distal pulses.    Eval for signs of muscle damage given fall, check for objective signs of anemia to explain weak sensation like.  No head strike, no headache, no vision changes distress ICH but we will obtain screening CT head.  Check for metabolic derangement such as hyperkalemia to explain weakness.  Obtain cardiac enzymes.  VBG to check for acid-base imbalance.  X-ray pelvis, x-ray chest, CT head, EKG to screen for conduction abnormalities pacemaker malfunction.  Patient denies any pain at this time.  Denies any preceding pain to her episode either.    -> EKG with independent interpretation by me AV dual paced rhythm, rate 61, , , QTc 489, no diagnostic ischemic changes.  Pelvis x-ray with independent visualization by me without visible fracture.  Chest x-ray with visible interpretation by me with clear lungs no focal opacity.  CT head with independent interpretation by me without visible intracranial hemorrhage. Cr mild elevation to 1.2 from baseline 1.0 with mild elevation in bun to 50. patient prefers oral hydration. Patient has been able to ambulate with one-person assist to the bathroom thus far.  All workup discussed with patient.  Discussed that we do not have a clear explanation for symptoms.  Offered admission for further workup patient declined at this time, prefers to go home and be more comfortable.  Strict return precaution discussed with verbal understanding expressed.  Stable for discharge home with outpatient follow-up. Attending Amol Nicole:  89 female with a history of peripheral vascular disease, hypertension, pacemaker, hypothyroidism, high cholesterol on aspirin and clopidogrel, pacemaker here after unwitnessed but mechanical fall.  Patient states she was walking with her walker which is her baseline, states her legs gave out on her and she fell onto her butt.  Patient unable to stand at the time EMS was called.  Denies head strike, head trauma.  Denies any seizure-like activity, any LOC.  Denies any preceding or post eating chest pain, shortness of breath, diaphoresis.  Usual state of health prior to this.  Denies any infection symptoms, bleeding anywhere.  Still tolerating p.o., having bowel movements, urinating normally without any can discomfort or pain.    Hemodynamically stable.  abcs intact, gcs 15, No acute distress awake alert oriented x 3, nc/at, no drift.  CN II to XII intact.  No audible carotid bruit.  Steady unassisted gait with 1 person.  Regular rate and rhythm though audible systolic murmur.  Clear lungs no increased work of breathing.  No signs of clinical anemia or fluid overload.  No rash. chronically distended nontender abdomen. no peritoneal signs.  Full range of motion bilateral lower extremities and upper extremities bilaterally.  Neurovasc intact with 2+ distal pulses. nontender extrem. stable pelvis and chest wall.    Eval for signs of muscle damage given fall, check for objective signs of anemia to explain weak sensation like.  No head strike, no headache, no vision changes distress ICH but we will obtain screening CT head.  Check for metabolic derangement such as hyperkalemia to explain weakness.  Obtain cardiac enzymes.  VBG to check for acid-base imbalance.  X-ray pelvis, x-ray chest, CT head, EKG to screen for conduction abnormalities pacemaker malfunction.  Patient denies any pain at this time.  Denies any preceding pain to her episode either.    -> EKG with independent interpretation by me AV dual paced rhythm, rate 61, , , QTc 489, no diagnostic ischemic changes.  Pelvis x-ray with independent visualization by me without visible fracture.  Chest x-ray with visible interpretation by me with clear lungs no focal opacity.  CT head with independent interpretation by me without visible intracranial hemorrhage. Cr mild elevation to 1.2 from baseline 1.0 with mild elevation in bun to 50. patient prefers oral hydration. Patient has been able to ambulate with one-person assist to the bathroom thus far.  All workup discussed with patient.  Discussed that we do not have a clear explanation for symptoms.  Offered admission for further workup patient declined at this time, prefers to go home and be more comfortable.  Strict return precaution discussed with verbal understanding expressed.  Stable for discharge home with outpatient follow-up.

## 2024-08-13 NOTE — ED ADULT NURSE NOTE - NSICDXPASTSURGICALHX_GEN_ALL_CORE_FT
PAST SURGICAL HISTORY:  Artificial pacemaker 2016 [facility], [facility]    H/O angioplasty Bilateral femoral stents; R. carotid artery stents

## 2024-08-13 NOTE — ED PROVIDER NOTE - WR ORDER ID 1
Dermatologists:    Steven Angel.  MD Jackie Simmons)  [phone]    Gregery Buerger, MD ([facility]osselin [address] office  Kasia Duncan, General Dermatology  [facility] - atypical wounds  [phone]  [facility]             [phone]  [address]  [address] [place] [address] [id number]

## 2024-08-13 NOTE — ED ADULT NURSE NOTE - OBJECTIVE STATEMENT
89Y female, pmh of peripheral vascular disease, hypertension, pacemaker, hypothyroidism, high cholesterol on aspirin and clopidogrel, pacemaker here after unwitnessed but mechanical fall.  Patient states she was walking with her walker which is her baseline, states her legs gave out on her and she fell onto her butt.  Patient unable to stand at the time EMS was called.  Denies head strike, head trauma.  Denies any seizure-like activity, any LOC.  Denies any preceding or post eating chest pain, shortness of breath, diaphoresis.  Usual state of health prior to this.  Denies any infection symptoms, bleeding anywhere.  Still tolerating p.o., having bowel movements, urinating normally without any can discomfort or pain.

## 2024-09-09 ENCOUNTER — APPOINTMENT (OUTPATIENT)
Dept: ENDOCRINOLOGY | Facility: CLINIC | Age: 89
End: 2024-09-09
Payer: MEDICARE

## 2024-09-09 VITALS
BODY MASS INDEX: 26.9 KG/M2 | OXYGEN SATURATION: 100 % | WEIGHT: 137 LBS | SYSTOLIC BLOOD PRESSURE: 155 MMHG | HEIGHT: 60 IN | DIASTOLIC BLOOD PRESSURE: 66 MMHG | HEART RATE: 89 BPM

## 2024-09-09 DIAGNOSIS — I10 ESSENTIAL (PRIMARY) HYPERTENSION: ICD-10-CM

## 2024-09-09 DIAGNOSIS — E04.2 NONTOXIC MULTINODULAR GOITER: ICD-10-CM

## 2024-09-09 DIAGNOSIS — E78.5 HYPERLIPIDEMIA, UNSPECIFIED: ICD-10-CM

## 2024-09-09 DIAGNOSIS — E03.9 HYPOTHYROIDISM, UNSPECIFIED: ICD-10-CM

## 2024-09-09 PROCEDURE — G2211 COMPLEX E/M VISIT ADD ON: CPT

## 2024-09-09 PROCEDURE — 99214 OFFICE O/P EST MOD 30 MIN: CPT

## 2024-09-09 NOTE — PROCEDURE
[Bugsnag e 2008 model, 10-12 MHz frequencies] : multiple real time longitudinal and transverse images were obtained using a high resolution ultrasound with a linear transducer, Bugsnag e 2008 model, 10-12 MHz frequencies. All measurements will be reported as longitudinal x sue-posterior x transverse. [None] : None [Multinodular Goiter] : multinodular goiter [] : a heterogeneous parenchyma  [Mid] : mid pole there is a  [No] : does not have a halo [No calcification] : no calcification [Upper] : upper pole there is a  [No vascularity] : no vascularity [Left Thyroid] : left [Lower] : lower pole there is a  [Mixed] : mixed [Predominantly Solid] : predominantly solid [Round] : round in shape [Peripheral vascularity] : peripheral vascularity [Isthmus] : isthmus there is a  [Cyst] : cyst [Ovoid] : ovoid in shape [Smooth] : smooth [No] : does not have a halo [No calcifications] : no calcifications ["Cat eye" artifact] : "cat eye" artifact [FreeTextEntry1] : 4.21 x 1.17 x 1.82 [FreeTextEntry5] : 5.12 x 1.75 x 1.77 [FreeTextEntry2] : 0.20 [FreeTextEntry3] : 1.06 x 0.94 x 1.02

## 2024-09-09 NOTE — HISTORY OF PRESENT ILLNESS
[FreeTextEntry1] : 89 y/o F w/ hx of hypothyroidism dx in 2008 on routine lab work. No hx of thyroid surgery. No hx of radiation to the head or neck. Granddaughters w/ Hashimoto's and Graves'. Synthroid was started and had been on stable dose since diagnosis at 88 mcg daily until March of 2019 TSH 6.68, recommended increase to extra 1/2 tab weekly which she was taking on Mondays. Was chemically euthyroid until 5/2021 with TSH of 6.3 and Free T4 of 1.1. PCP recommended increase to 100 mcg daily. Increased to 112 mcg 8/2022 for TSH above goal with worsening constipation. Has been taking since then. Takes daily in the morning on empty stomach. Adherent without missed doses. Never on lithium or amiodarone. No palpitations, tremors, appetite good. +weight loss s/p rehab stay now stable. +heat intolerance and constipation. +fatigue. Not sleeping well due to restless leg syndrome.   Hx of thyroid cysts.  Thyroid US performed 7/2020: In the left mid pole there is a  cyst. It measures 1.43 x 1.25 x 0.98 cm. The nodule is round in shape and the border is smooth. The nodule does not have a halo. It demonstrates no calcification. It has no vascularity.   In the left upper pole there is a  cyst. It measures 0.61 x 0.61 x 0.49 cm. The nodule is round in shape and the border is smooth. The nodule does not have a halo. It demonstrates no calcifications. It has no vascularity.   In the left lower pole there is a  mixed, predominantly solid. It measures 1.06 x 0.94 x 1.02 cm. The nodule is round in shape and the border is smooth. The nodule does not have a halo. It demonstrates no calcifications. It has peripheral vascularity.   In the isthmus there is a  cyst. It measures 0.71 x 0.53 x 0.68 cm. The nodule is ovoid in shape and the border is smooth. The nodule does not have a halo. It demonstrates no calcifications. The cyst has "cat eye" artifact. No dysphagia or dysphonia. No hx of FNA.

## 2024-09-09 NOTE — PHYSICAL EXAM
[Alert] : alert [Well Nourished] : well nourished [Healthy Appearance] : healthy appearance [No Acute Distress] : no acute distress [Well Developed] : well developed [EOMI] : extra ocular movement intact [No Proptosis] : no proptosis [No Respiratory Distress] : no respiratory distress [No Accessory Muscle Use] : no accessory muscle use [Normal Rate and Effort] : normal respiratory rate and effort [Clear to Auscultation] : lungs were clear to auscultation bilaterally [Normal S1, S2] : normal S1 and S2 [Normal Rate] : heart rate was normal [Regular Rhythm] : with a regular rhythm [Soft] : abdomen soft [No Stigmata of Cushings Syndrome] : no stigmata of Cushings Syndrome [Oriented x3] : oriented to person, place, and time [Normal Affect] : the affect was normal [Normal Mood] : the mood was normal [de-identified] : +Heterogenous gland [de-identified] : +LE edema left > right mostly in the ankle

## 2024-09-09 NOTE — ASSESSMENT
[FreeTextEntry1] : 89 y/o F w/  1. Hypothyroidism- Clinically euthyroid on exam. Currently on 112 mcg daily. Will adjust as needed for goal TSH of 1-5.   2. Multinodular Goiter- Suspect nodules were incidentally noted on workup for carotid artery stenosis in 2004. Thyroid US performed 3/2019 with right and left sided cysts and nodules (see results section for full description). Repeat thyroid US stable today. Can repeat in 3 years.    3. HTN- BP close to goal, c/w irbesartan and hydralazine  4. HLD- c/w statin  Prep time with review of labs and interval progress notes and consultations  Discussion with patient regarding hypothyroidism with updated changes made by PCP and thyroid nodules with treatment options and goals of care answering all patients questions and addressing all concerns  Focused Thyroid US performed at the office today.  Post-visit completion charting and review  Total Time 30 min  Specifically causes, evaluation, treatment options, risks, complications, and benefits of available therapies were discussed. Questions were answered.  The submitted E/M billing level for this visit reflects the total time spent on the day of the visit including face-to-face time spent with the patient, non-face-to-face review of medical records and relevant information, documentation, and asynchronous communication with the patient after a visit via phone, email, or patient's EHR portal after the visit.  The medical records reviewed are either scanned into the chart or reviewed with the patient using a patient's electronic medical records portal for patients with records not available to [facility] via electronic transmission platforms from other institutions and labs.  Time spend counseling and performing coordination of care was also included in determining the appropriate EM billing level.  I have reviewed and verified information regarding the chief complaint and history recorded by the ancillary staff and/or the patient. I have independently reviewed and interpreted tests performed by other physicians and facilities as necessary.   I have discussed with the patient differential diagnosis, reason for auxiliary tests if ordered, risks, benefits, alternatives, and complications of each form of therapy were discussed.

## 2024-09-09 NOTE — DATA REVIEWED
[FreeTextEntry1] : Focused Thyroid US 9/9/2024: In the right mid pole there is a  cyst. It measures 1.13 x 1.1 x 0.9 cm. The nodule is round in shape and the border is smooth. The nodule does not have a halo. It demonstrates no calcification. It has no vascularity.  Additional subcentimeter nodules and cysts b/l unchanged.   Labs 8/2022: TSH 5.62 CMP normal except Cr 1.41 Glucose 107 A1c 5.6% Vit D 66.2  Focused Thyroid US 8/23/2022: In the right mid pole there is a  cyst. It measures 1.2 x 1.1 x 0.8 cm. The nodule is round in shape and the border is smooth. The nodule does not have a halo. It demonstrates no calcification. It has no vascularity.  Additional subcentimeter nodules and cysts b/l unchanged.   Focused Thyroid US 8/17/2021: In the right mid pole there is a  cyst. It measures 1.4 x 1.0 x 0.86 cm. The nodule is round in shape and the border is smooth. The nodule does not have a halo. It demonstrates no calcification. It has no vascularity.  Additional subcentimeter nodules b/l unchanged.   Labs 7/2021: TSH 3  Labs 5/2021: TSH 6.3  Labs 9/2019: TSH 1.50 Free T4 1.4  Labs 3/2019: TSH 6.68 Free T4 1.3 Vit D 48.1  Thyroid Ultrasound Report 3/5/19:  Technique: multiple real time longitudinal and transverse images were obtained using a high resolution ultrasound with a linear transducer, [employer]ura Gear e 2008 model, 10-12 MHz frequencies. All measurements will be reported as longitudinal x sue-posterior x transverse.  Comparison: None.   Indication: multinodular goiter.   Findings:  The right thyroid lobe measures 4.21 x 1.17 x 1.82 cm. The left thyroid lobe measures 5.12 x 1.75 x 1.77 cm. The isthmus measures 0.20 cm.  The right thyroid lobe has a heterogeneous parenchyma.  The left thyroid lobe has a heterogeneous parenchyma .    In the left mid pole there is a  cyst. It measures 1.43 x 1.25 x 0.98 cm. The nodule is round in shape and the border is smooth. The nodule does not have a halo. It demonstrates no calcification. It has no vascularity.   In the left upper pole there is a  cyst. It measures 0.61 x 0.61 x 0.49 cm. The nodule is round in shape and the border is smooth. The nodule does not have a halo. It demonstrates no calcifications. It has no vascularity.   In the left lower pole there is a  mixed, predominantly solid. It measures 1.06 x 0.94 x 1.02 cm. The nodule is round in shape and the border is smooth. The nodule does not have a halo. It demonstrates no calcifications. It has peripheral vascularity.   In the isthmus there is a  cyst. It measures 0.71 x 0.53 x 0.68 cm. The nodule is ovoid in shape and the border is smooth. The nodule does not have a halo. It demonstrates no calcifications. The cyst has "cat eye" artifact.   Labs 9/2017: Cr 1.36 Glucose 04

## 2024-09-09 NOTE — REVIEW OF SYSTEMS
[Fatigue] : fatigue [Lower Ext Edema] : lower extremity edema [All other systems negative] : All other systems negative [Constipation] : constipation [Recent Weight Gain (___ Lbs)] : no recent weight gain [Recent Weight Loss (___ Lbs)] : no recent weight loss [Visual Field Defect] : no visual field defect [Dysphagia] : no dysphagia [Dysphonia] : no dysphonia [Chest Pain] : no chest pain [Palpitations] : no palpitations [Shortness Of Breath] : no shortness of breath [Nausea] : no nausea [Vomiting] : no vomiting [Polyuria] : no polyuria [Joint Pain] : no joint pain [Headaches] : no headaches [Tremors] : no tremors [Polydipsia] : no polydipsia

## 2024-09-10 LAB
ALBUMIN SERPL ELPH-MCNC: 4.3 G/DL
ALP BLD-CCNC: 57 U/L
ALT SERPL-CCNC: 17 U/L
ANION GAP SERPL CALC-SCNC: 12 MMOL/L
AST SERPL-CCNC: 23 U/L
BASOPHILS # BLD AUTO: 0.11 K/UL
BASOPHILS NFR BLD AUTO: 1.3 %
BILIRUB SERPL-MCNC: 0.3 MG/DL
BUN SERPL-MCNC: 45 MG/DL
CALCIUM SERPL-MCNC: 10.1 MG/DL
CHLORIDE SERPL-SCNC: 103 MMOL/L
CHOLEST SERPL-MCNC: 157 MG/DL
CO2 SERPL-SCNC: 25 MMOL/L
CREAT SERPL-MCNC: 1.02 MG/DL
EGFR: 50 ML/MIN/1.73M2
EOSINOPHIL # BLD AUTO: 0.16 K/UL
EOSINOPHIL NFR BLD AUTO: 1.8 %
ESTIMATED AVERAGE GLUCOSE: 117 MG/DL
GLUCOSE SERPL-MCNC: 98 MG/DL
HBA1C MFR BLD HPLC: 5.7 %
HCT VFR BLD CALC: 37.3 %
HDLC SERPL-MCNC: 62 MG/DL
HGB BLD-MCNC: 11.4 G/DL
IMM GRANULOCYTES NFR BLD AUTO: 0.2 %
LDLC SERPL CALC-MCNC: 83 MG/DL
LYMPHOCYTES # BLD AUTO: 2.08 K/UL
LYMPHOCYTES NFR BLD AUTO: 23.9 %
MAN DIFF?: NORMAL
MCHC RBC-ENTMCNC: 30.2 PG
MCHC RBC-ENTMCNC: 30.6 GM/DL
MCV RBC AUTO: 98.7 FL
MONOCYTES # BLD AUTO: 0.87 K/UL
MONOCYTES NFR BLD AUTO: 10 %
NEUTROPHILS # BLD AUTO: 5.45 K/UL
NEUTROPHILS NFR BLD AUTO: 62.8 %
NONHDLC SERPL-MCNC: 95 MG/DL
PLATELET # BLD AUTO: 230 K/UL
POTASSIUM SERPL-SCNC: 5.2 MMOL/L
PROT SERPL-MCNC: 6.6 G/DL
RBC # BLD: 3.78 M/UL
RBC # FLD: 14 %
SODIUM SERPL-SCNC: 140 MMOL/L
T4 FREE SERPL-MCNC: 1.7 NG/DL
TRIGL SERPL-MCNC: 58 MG/DL
TSH SERPL-ACNC: 0.18 UIU/ML
WBC # FLD AUTO: 8.69 K/UL

## 2024-11-09 ENCOUNTER — NON-APPOINTMENT (OUTPATIENT)
Age: 89
End: 2024-11-09
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
## 2025-02-01 NOTE — ED PROVIDER NOTE - DISCHARGE REVIEW MATERIAL PRESENTED
.
PAST SURGICAL HISTORY:  History of coronary artery bypass surgery     History of fusion of cervical spine     S/P femoral-tibial bypass

## 2025-02-02 NOTE — ED PROVIDER NOTE - PRO INTERPRETER NEED 2
in ASU: pt admits to having brain surgery on Thursday, presents to ed with redness/swelling and bruising to R side of face/eye. pt c.o midsternal chest tightness worsening with inhalation since last night. denies recent cough/fever/chills. ekg in progress. English

## 2025-09-08 ENCOUNTER — APPOINTMENT (OUTPATIENT)
Dept: ENDOCRINOLOGY | Facility: CLINIC | Age: OVER 89
End: 2025-09-08
Payer: MEDICARE

## 2025-09-08 VITALS
OXYGEN SATURATION: 98 % | HEART RATE: 64 BPM | SYSTOLIC BLOOD PRESSURE: 122 MMHG | BODY MASS INDEX: 22.97 KG/M2 | WEIGHT: 117 LBS | DIASTOLIC BLOOD PRESSURE: 57 MMHG | HEIGHT: 60 IN

## 2025-09-08 DIAGNOSIS — E03.9 HYPOTHYROIDISM, UNSPECIFIED: ICD-10-CM

## 2025-09-08 DIAGNOSIS — E78.5 HYPERLIPIDEMIA, UNSPECIFIED: ICD-10-CM

## 2025-09-08 DIAGNOSIS — I10 ESSENTIAL (PRIMARY) HYPERTENSION: ICD-10-CM

## 2025-09-08 DIAGNOSIS — M81.0 AGE-RELATED OSTEOPOROSIS W/OUT CURRENT PATHOLOGICAL FRACTURE: ICD-10-CM

## 2025-09-08 DIAGNOSIS — E04.2 NONTOXIC MULTINODULAR GOITER: ICD-10-CM

## 2025-09-08 PROCEDURE — G2211 COMPLEX E/M VISIT ADD ON: CPT

## 2025-09-08 PROCEDURE — 99214 OFFICE O/P EST MOD 30 MIN: CPT

## 2025-09-10 LAB
25(OH)D3 SERPL-MCNC: 68.5 NG/ML
ALBUMIN SERPL ELPH-MCNC: 4.5 G/DL
ALP BLD-CCNC: 55 U/L
ALT SERPL-CCNC: 21 U/L
ANION GAP SERPL CALC-SCNC: 10 MMOL/L
AST SERPL-CCNC: 21 U/L
BILIRUB SERPL-MCNC: 0.3 MG/DL
BUN SERPL-MCNC: 57 MG/DL
CALCIUM SERPL-MCNC: 10.4 MG/DL
CHLORIDE SERPL-SCNC: 104 MMOL/L
CHOLEST SERPL-MCNC: 139 MG/DL
CO2 SERPL-SCNC: 23 MMOL/L
CREAT SERPL-MCNC: 1.17 MG/DL
EGFRCR SERPLBLD CKD-EPI 2021: 42 ML/MIN/1.73M2
ESTIMATED AVERAGE GLUCOSE: 111 MG/DL
GLUCOSE SERPL-MCNC: 101 MG/DL
HBA1C MFR BLD HPLC: 5.5 %
HDLC SERPL-MCNC: 61 MG/DL
LDLC SERPL-MCNC: 65 MG/DL
NONHDLC SERPL-MCNC: 78 MG/DL
POTASSIUM SERPL-SCNC: 5.4 MMOL/L
PROT SERPL-MCNC: 6.4 G/DL
SODIUM SERPL-SCNC: 138 MMOL/L
T4 FREE SERPL-MCNC: 1.6 NG/DL
TRIGL SERPL-MCNC: 57 MG/DL
TSH SERPL-ACNC: 1.12 UIU/ML